# Patient Record
Sex: FEMALE | Race: WHITE | NOT HISPANIC OR LATINO | Employment: UNEMPLOYED | URBAN - METROPOLITAN AREA
[De-identification: names, ages, dates, MRNs, and addresses within clinical notes are randomized per-mention and may not be internally consistent; named-entity substitution may affect disease eponyms.]

---

## 2019-08-22 ENCOUNTER — CONVERSION ENCOUNTER (OUTPATIENT)
Dept: INTERNAL MEDICINE | Facility: CLINIC | Age: 59
End: 2019-08-22

## 2019-08-22 ENCOUNTER — OFFICE VISIT CONVERTED (OUTPATIENT)
Dept: INTERNAL MEDICINE | Facility: CLINIC | Age: 59
End: 2019-08-22
Attending: INTERNAL MEDICINE

## 2019-08-22 ENCOUNTER — HOSPITAL ENCOUNTER (OUTPATIENT)
Dept: OTHER | Facility: HOSPITAL | Age: 59
Discharge: HOME | End: 2019-08-22
Attending: INTERNAL MEDICINE

## 2019-08-22 LAB
ALBUMIN SERPL-MCNC: 4.1 G/DL (ref 3.5–5)
ALBUMIN/GLOB SERPL: 1.3 {RATIO} (ref 1.4–2.6)
ALP SERPL-CCNC: 117 U/L (ref 53–141)
ALT SERPL-CCNC: 12 U/L (ref 10–40)
ANION GAP SERPL CALC-SCNC: 19 MMOL/L (ref 8–19)
AST SERPL-CCNC: 20 U/L (ref 15–50)
BASOPHILS # BLD AUTO: 0.06 10*3/UL (ref 0–0.2)
BASOPHILS NFR BLD AUTO: 0.5 % (ref 0–3)
BILIRUB SERPL-MCNC: 0.28 MG/DL (ref 0.2–1.3)
BNP SERPL-MCNC: 634 PG/ML (ref 0–900)
BUN SERPL-MCNC: 13 MG/DL (ref 5–25)
BUN/CREAT SERPL: 15 {RATIO} (ref 6–20)
CALCIUM SERPL-MCNC: 9.2 MG/DL (ref 8.7–10.4)
CHLORIDE SERPL-SCNC: 100 MMOL/L (ref 99–111)
CHOLEST SERPL-MCNC: 244 MG/DL (ref 107–200)
CHOLEST/HDLC SERPL: 5.5 {RATIO} (ref 3–6)
CONV ABS IMM GRAN: 0.05 10*3/UL (ref 0–0.2)
CONV CO2: 26 MMOL/L (ref 22–32)
CONV IMMATURE GRAN: 0.4 % (ref 0–1.8)
CONV TOTAL PROTEIN: 7.2 G/DL (ref 6.3–8.2)
CREAT UR-MCNC: 0.85 MG/DL (ref 0.5–0.9)
DEPRECATED RDW RBC AUTO: 49.4 FL (ref 36.4–46.3)
EOSINOPHIL # BLD AUTO: 0.09 10*3/UL (ref 0–0.7)
EOSINOPHIL # BLD AUTO: 0.8 % (ref 0–7)
ERYTHROCYTE [DISTWIDTH] IN BLOOD BY AUTOMATED COUNT: 13.8 % (ref 11.7–14.4)
GFR SERPLBLD BASED ON 1.73 SQ M-ARVRAT: >60 ML/MIN/{1.73_M2}
GLOBULIN UR ELPH-MCNC: 3.1 G/DL (ref 2–3.5)
GLUCOSE SERPL-MCNC: 66 MG/DL (ref 65–99)
HCT VFR BLD AUTO: 44.1 % (ref 37–47)
HDLC SERPL-MCNC: 44 MG/DL (ref 40–60)
HGB BLD-MCNC: 13.4 G/DL (ref 12–16)
LDLC SERPL CALC-MCNC: 142 MG/DL (ref 70–100)
LYMPHOCYTES # BLD AUTO: 2.45 10*3/UL (ref 1–5)
LYMPHOCYTES NFR BLD AUTO: 20.7 % (ref 20–45)
MCH RBC QN AUTO: 29.6 PG (ref 27–31)
MCHC RBC AUTO-ENTMCNC: 30.4 G/DL (ref 33–37)
MCV RBC AUTO: 97.4 FL (ref 81–99)
MONOCYTES # BLD AUTO: 0.79 10*3/UL (ref 0.2–1.2)
MONOCYTES NFR BLD AUTO: 6.7 % (ref 3–10)
NEUTROPHILS # BLD AUTO: 8.37 10*3/UL (ref 2–8)
NEUTROPHILS NFR BLD AUTO: 70.9 % (ref 30–85)
NRBC CBCN: 0 % (ref 0–0.7)
OSMOLALITY SERPL CALC.SUM OF ELEC: 290 MOSM/KG (ref 273–304)
PLATELET # BLD AUTO: 318 10*3/UL (ref 130–400)
PMV BLD AUTO: 11.4 FL (ref 9.4–12.3)
POTASSIUM SERPL-SCNC: 4.3 MMOL/L (ref 3.5–5.3)
RBC # BLD AUTO: 4.53 10*6/UL (ref 4.2–5.4)
SODIUM SERPL-SCNC: 141 MMOL/L (ref 135–147)
T4 FREE SERPL-MCNC: 0.4 NG/DL (ref 0.9–1.8)
TRIGL SERPL-MCNC: 290 MG/DL (ref 40–150)
TSH SERPL-ACNC: 35.05 M[IU]/L (ref 0.27–4.2)
VLDLC SERPL-MCNC: 58 MG/DL (ref 5–37)
WBC # BLD AUTO: 11.81 10*3/UL (ref 4.8–10.8)

## 2019-08-27 LAB
A1AT SERPL-MCNC: 153 MG/DL (ref 90–200)
PHENOTYPE: NORMAL

## 2019-08-29 ENCOUNTER — HOSPITAL ENCOUNTER (OUTPATIENT)
Dept: OTHER | Facility: HOSPITAL | Age: 59
Discharge: HOME | End: 2019-08-29
Attending: INTERNAL MEDICINE

## 2019-08-29 ENCOUNTER — OFFICE VISIT CONVERTED (OUTPATIENT)
Dept: INTERNAL MEDICINE | Facility: CLINIC | Age: 59
End: 2019-08-29
Attending: INTERNAL MEDICINE

## 2019-08-29 ENCOUNTER — CONVERSION ENCOUNTER (OUTPATIENT)
Dept: OTHER | Facility: HOSPITAL | Age: 59
End: 2019-08-29

## 2019-08-29 LAB
T4 FREE SERPL-MCNC: 0.8 NG/DL (ref 0.9–1.8)
TSH SERPL-ACNC: 21.61 M[IU]/L (ref 0.27–4.2)

## 2019-09-03 ENCOUNTER — HOSPITAL ENCOUNTER (OUTPATIENT)
Dept: ULTRASOUND IMAGING | Facility: HOSPITAL | Age: 59
Discharge: HOME | End: 2019-09-03
Attending: INTERNAL MEDICINE

## 2019-09-09 ENCOUNTER — HOSPITAL ENCOUNTER (OUTPATIENT)
Dept: CARDIOLOGY | Facility: HOSPITAL | Age: 59
Discharge: HOME | End: 2019-09-09
Attending: INTERNAL MEDICINE

## 2019-09-12 ENCOUNTER — HOSPITAL ENCOUNTER (OUTPATIENT)
Dept: OTHER | Facility: HOSPITAL | Age: 59
Discharge: HOME | End: 2019-09-12
Attending: INTERNAL MEDICINE

## 2019-09-12 ENCOUNTER — OFFICE VISIT CONVERTED (OUTPATIENT)
Dept: INTERNAL MEDICINE | Facility: CLINIC | Age: 59
End: 2019-09-12
Attending: INTERNAL MEDICINE

## 2019-09-12 LAB
T4 FREE SERPL-MCNC: 1.1 NG/DL (ref 0.9–1.8)
TSH SERPL-ACNC: 7.93 M[IU]/L (ref 0.27–4.2)

## 2019-09-19 ENCOUNTER — HOSPITAL ENCOUNTER (OUTPATIENT)
Dept: GENERAL RADIOLOGY | Facility: HOSPITAL | Age: 59
Discharge: HOME | End: 2019-09-19
Attending: INTERNAL MEDICINE

## 2019-12-12 ENCOUNTER — HOSPITAL ENCOUNTER (OUTPATIENT)
Dept: OTHER | Facility: HOSPITAL | Age: 59
Discharge: HOME | End: 2019-12-12
Attending: INTERNAL MEDICINE

## 2019-12-12 ENCOUNTER — OFFICE VISIT CONVERTED (OUTPATIENT)
Dept: INTERNAL MEDICINE | Facility: CLINIC | Age: 59
End: 2019-12-12
Attending: INTERNAL MEDICINE

## 2019-12-12 LAB
ALBUMIN SERPL-MCNC: 3.7 G/DL (ref 3.5–5)
ALBUMIN/GLOB SERPL: 1.2 {RATIO} (ref 1.4–2.6)
ALP SERPL-CCNC: 101 U/L (ref 53–141)
ALT SERPL-CCNC: 7 U/L (ref 10–40)
ANION GAP SERPL CALC-SCNC: 17 MMOL/L (ref 8–19)
AST SERPL-CCNC: 12 U/L (ref 15–50)
BASOPHILS # BLD AUTO: 0.05 10*3/UL (ref 0–0.2)
BASOPHILS NFR BLD AUTO: 0.6 % (ref 0–3)
BILIRUB SERPL-MCNC: 0.21 MG/DL (ref 0.2–1.3)
BUN SERPL-MCNC: 10 MG/DL (ref 5–25)
BUN/CREAT SERPL: 11 {RATIO} (ref 6–20)
CALCIUM SERPL-MCNC: 9.5 MG/DL (ref 8.7–10.4)
CHLORIDE SERPL-SCNC: 102 MMOL/L (ref 99–111)
CHOLEST SERPL-MCNC: 213 MG/DL (ref 107–200)
CHOLEST/HDLC SERPL: 5.8 {RATIO} (ref 3–6)
CONV ABS IMM GRAN: 0.03 10*3/UL (ref 0–0.2)
CONV CO2: 26 MMOL/L (ref 22–32)
CONV IMMATURE GRAN: 0.4 % (ref 0–1.8)
CONV TOTAL PROTEIN: 6.7 G/DL (ref 6.3–8.2)
CREAT UR-MCNC: 0.89 MG/DL (ref 0.5–0.9)
DEPRECATED RDW RBC AUTO: 45.3 FL (ref 36.4–46.3)
EOSINOPHIL # BLD AUTO: 0.11 10*3/UL (ref 0–0.7)
EOSINOPHIL # BLD AUTO: 1.3 % (ref 0–7)
ERYTHROCYTE [DISTWIDTH] IN BLOOD BY AUTOMATED COUNT: 12.7 % (ref 11.7–14.4)
GFR SERPLBLD BASED ON 1.73 SQ M-ARVRAT: >60 ML/MIN/{1.73_M2}
GLOBULIN UR ELPH-MCNC: 3 G/DL (ref 2–3.5)
GLUCOSE SERPL-MCNC: 76 MG/DL (ref 65–99)
HCT VFR BLD AUTO: 42.5 % (ref 37–47)
HDLC SERPL-MCNC: 37 MG/DL (ref 40–60)
HGB BLD-MCNC: 13.5 G/DL (ref 12–16)
LDLC SERPL CALC-MCNC: 137 MG/DL (ref 70–100)
LYMPHOCYTES # BLD AUTO: 2.18 10*3/UL (ref 1–5)
LYMPHOCYTES NFR BLD AUTO: 25.6 % (ref 20–45)
MCH RBC QN AUTO: 30.5 PG (ref 27–31)
MCHC RBC AUTO-ENTMCNC: 31.8 G/DL (ref 33–37)
MCV RBC AUTO: 96.2 FL (ref 81–99)
MONOCYTES # BLD AUTO: 0.56 10*3/UL (ref 0.2–1.2)
MONOCYTES NFR BLD AUTO: 6.6 % (ref 3–10)
NEUTROPHILS # BLD AUTO: 5.57 10*3/UL (ref 2–8)
NEUTROPHILS NFR BLD AUTO: 65.5 % (ref 30–85)
NRBC CBCN: 0 % (ref 0–0.7)
OSMOLALITY SERPL CALC.SUM OF ELEC: 288 MOSM/KG (ref 273–304)
PLATELET # BLD AUTO: 318 10*3/UL (ref 130–400)
PMV BLD AUTO: 11.5 FL (ref 9.4–12.3)
POTASSIUM SERPL-SCNC: 4.8 MMOL/L (ref 3.5–5.3)
RBC # BLD AUTO: 4.42 10*6/UL (ref 4.2–5.4)
SODIUM SERPL-SCNC: 140 MMOL/L (ref 135–147)
T4 FREE SERPL-MCNC: 0.8 NG/DL (ref 0.9–1.8)
TRIGL SERPL-MCNC: 196 MG/DL (ref 40–150)
TSH SERPL-ACNC: 12.03 M[IU]/L (ref 0.27–4.2)
VLDLC SERPL-MCNC: 39 MG/DL (ref 5–37)
WBC # BLD AUTO: 8.5 10*3/UL (ref 4.8–10.8)

## 2020-02-10 ENCOUNTER — HOSPITAL ENCOUNTER (OUTPATIENT)
Dept: OTHER | Facility: HOSPITAL | Age: 60
Discharge: HOME | End: 2020-02-10
Attending: INTERNAL MEDICINE

## 2020-02-10 LAB
T4 FREE SERPL-MCNC: 0.9 NG/DL (ref 0.9–1.8)
TSH SERPL-ACNC: 10.7 M[IU]/L (ref 0.27–4.2)

## 2021-05-15 VITALS
WEIGHT: 212.5 LBS | HEIGHT: 55 IN | BODY MASS INDEX: 49.18 KG/M2 | HEART RATE: 69 BPM | TEMPERATURE: 97.2 F | SYSTOLIC BLOOD PRESSURE: 132 MMHG | OXYGEN SATURATION: 100 % | DIASTOLIC BLOOD PRESSURE: 74 MMHG

## 2021-05-15 VITALS
OXYGEN SATURATION: 97 % | TEMPERATURE: 97.3 F | DIASTOLIC BLOOD PRESSURE: 78 MMHG | HEART RATE: 77 BPM | BODY MASS INDEX: 38.56 KG/M2 | WEIGHT: 204.25 LBS | SYSTOLIC BLOOD PRESSURE: 142 MMHG | HEIGHT: 61 IN

## 2021-05-15 VITALS
DIASTOLIC BLOOD PRESSURE: 84 MMHG | OXYGEN SATURATION: 97 % | WEIGHT: 212.37 LBS | TEMPERATURE: 96.8 F | SYSTOLIC BLOOD PRESSURE: 130 MMHG | BODY MASS INDEX: 40.1 KG/M2 | HEIGHT: 61 IN | HEART RATE: 78 BPM

## 2021-05-15 VITALS
OXYGEN SATURATION: 98 % | WEIGHT: 212 LBS | HEART RATE: 71 BPM | DIASTOLIC BLOOD PRESSURE: 74 MMHG | BODY MASS INDEX: 40.02 KG/M2 | SYSTOLIC BLOOD PRESSURE: 122 MMHG | TEMPERATURE: 96.1 F | HEIGHT: 61 IN

## 2021-06-12 RX ORDER — TIOTROPIUM BROMIDE AND OLODATEROL 3.124; 2.736 UG/1; UG/1
SPRAY, METERED RESPIRATORY (INHALATION)
Qty: 4 G | Refills: 0 | OUTPATIENT
Start: 2021-06-12

## 2021-06-12 RX ORDER — IPRATROPIUM/ALBUTEROL SULFATE 20-100 MCG
MIST INHALER (GRAM) INHALATION
Qty: 4 G | Refills: 2 | OUTPATIENT
Start: 2021-06-12

## 2021-06-15 NOTE — TELEPHONE ENCOUNTER
Please call patient and schedule appointment. Once appointment is made, we can send in refill to Dr. Tristan.

## 2021-07-08 RX ORDER — MIRTAZAPINE 30 MG/1
TABLET, FILM COATED ORAL
COMMUNITY
End: 2022-09-30 | Stop reason: SDDI

## 2021-07-08 RX ORDER — HYDROXYZINE 50 MG/1
TABLET, FILM COATED ORAL
COMMUNITY
End: 2022-09-30 | Stop reason: SDDI

## 2021-07-08 RX ORDER — LEVOTHYROXINE SODIUM 88 UG/1
TABLET ORAL
COMMUNITY
End: 2022-09-30 | Stop reason: SDDI

## 2021-07-08 RX ORDER — GABAPENTIN 400 MG/1
CAPSULE ORAL
COMMUNITY
End: 2022-09-30 | Stop reason: SDDI

## 2021-07-08 NOTE — TELEPHONE ENCOUNTER
Caller: Denise Brito    Relationship: Self    Best call back number: [phone]     Medication needed:   Requested Prescriptions      No prescriptions requested or ordered in this encounter     Combivent Respimat  mcg/actuation inhalation mist  inhale 1 puff by inhalation route 4 times per day ; may take additional puffs as needed not to exceed 6 puffs in 24hrs      When do you need the refill by: ASAP    Does the patient have less than a 3 day supply:  [] Yes  [x] No    What is the patient's preferred pharmacy: [facility] - [address] [phone] [facility] [phone]

## 2022-05-11 NOTE — TELEPHONE ENCOUNTER
Caller: Denise Brito    Relationship: Self    Best call back number: [phone]    Requested Prescriptions:   Requested Prescriptions     Pending Prescriptions Disp Refills   • ipratropium-albuterol (COMBIVENT RESPIMAT)  MCG/ACT inhaler 3 each 0     Sig: Inhale 1 puff 4 (Four) Times a Day As Needed for Wheezing or Shortness of Air.        Pharmacy where request should be sent: [facility] [address] [phone] PH - [phone] FX       Does the patient have less than a 3 day supply:  [x] Yes  [] No    Justen ALBA Rep   05/11/22 16:18 EDT

## 2022-07-21 NOTE — TELEPHONE ENCOUNTER
Caller: Denise Brito    Relationship: Self    Best call back number: [phone]    Requested Prescriptions:   Requested Prescriptions     Pending Prescriptions Disp Refills   • ipratropium-albuterol (COMBIVENT RESPIMAT)  MCG/ACT inhaler 3 each 0     Sig: Inhale 1 puff 4 (Four) Times a Day As Needed for Wheezing or Shortness of Air.        Pharmacy where request should be sent: [facility] [address] [phone] PH - [phone]      Additional details provided by patient: PATIENT CALLED STATING SHE IS NEEDING THIS TO BE SENT TO THE PHARMACY DUE TO HER BEING COMPLETELY OUT      Does the patient have less than a 3 day supply:  [x] Yes  [] No    Justen Alvarez Rep   07/21/22 14:56 EDT

## 2022-09-14 ENCOUNTER — NURSE TRIAGE (OUTPATIENT)
Dept: CALL CENTER | Facility: HOSPITAL | Age: 62
End: 2022-09-14

## 2022-09-14 NOTE — TELEPHONE ENCOUNTER
"HUB call. Patient currently denies any respiratory distress or chest pain. Requesting f/u appt. For some increased SOA at night. Connected patient with nurse at Dr. Tristan's office thru [facility] [phone] for appointment.    Reason for Disposition  • Nursing judgment    Additional Information  • Negative: Nursing judgment  • Negative: Nursing judgment    Answer Assessment - Initial Assessment Questions  1. REASON FOR CALL or QUESTION: \"What is your reason for calling today?\" or \"How can I best help you?\" or \"What question do you have that I can help answer?\"      Caller requesting appointment for f/u. States having increased soa at night time. Currently denies any soa but has hx of CHF and COPD.    Protocols used: NO PROTOCOL AVAILABLE - INFORMATION ONLY-ADULT-[place]      "

## 2022-09-30 ENCOUNTER — OFFICE VISIT (OUTPATIENT)
Dept: INTERNAL MEDICINE | Facility: CLINIC | Age: 62
End: 2022-09-30

## 2022-09-30 VITALS
HEART RATE: 67 BPM | RESPIRATION RATE: 17 BRPM | OXYGEN SATURATION: 97 % | BODY MASS INDEX: 46.03 KG/M2 | SYSTOLIC BLOOD PRESSURE: 126 MMHG | WEIGHT: 243.6 LBS | TEMPERATURE: 98.1 F | DIASTOLIC BLOOD PRESSURE: 76 MMHG

## 2022-09-30 DIAGNOSIS — R06.09 DYSPNEA ON EXERTION: Primary | ICD-10-CM

## 2022-09-30 DIAGNOSIS — Z76.89 ENCOUNTER FOR SOCIAL WORK INTERVENTION: ICD-10-CM

## 2022-09-30 DIAGNOSIS — F41.9 ANXIETY: ICD-10-CM

## 2022-09-30 DIAGNOSIS — J41.8 MIXED SIMPLE AND MUCOPURULENT CHRONIC BRONCHITIS: ICD-10-CM

## 2022-09-30 PROBLEM — M19.90 ARTHRITIS: Status: ACTIVE | Noted: 2022-09-30

## 2022-09-30 PROBLEM — J44.9 COPD (CHRONIC OBSTRUCTIVE PULMONARY DISEASE) (HCC): Status: ACTIVE | Noted: 2022-09-30

## 2022-09-30 PROBLEM — R61 NIGHT SWEATS: Status: ACTIVE | Noted: 2022-09-30

## 2022-09-30 PROBLEM — J45.909 ASTHMA: Status: ACTIVE | Noted: 2022-09-30

## 2022-09-30 PROBLEM — I50.9 CHF (CONGESTIVE HEART FAILURE) (HCC): Status: ACTIVE | Noted: 2022-09-30

## 2022-09-30 LAB
ALBUMIN SERPL-MCNC: 4.1 G/DL (ref 3.5–5.2)
ALBUMIN/GLOB SERPL: 1.4 G/DL
ALP SERPL-CCNC: 106 U/L (ref 39–117)
ALT SERPL W P-5'-P-CCNC: 12 U/L (ref 1–33)
ANION GAP SERPL CALCULATED.3IONS-SCNC: 7.7 MMOL/L (ref 5–15)
AST SERPL-CCNC: 19 U/L (ref 1–32)
BASOPHILS # BLD AUTO: 0.06 10*3/MM3 (ref 0–0.2)
BASOPHILS NFR BLD AUTO: 0.6 % (ref 0–1.5)
BILIRUB SERPL-MCNC: 0.3 MG/DL (ref 0–1.2)
BUN SERPL-MCNC: 17 MG/DL (ref 8–23)
BUN/CREAT SERPL: 17.2 (ref 7–25)
CALCIUM SPEC-SCNC: 9 MG/DL (ref 8.6–10.5)
CHLORIDE SERPL-SCNC: 101 MMOL/L (ref 98–107)
CHOLEST SERPL-MCNC: 259 MG/DL (ref 0–200)
CO2 SERPL-SCNC: 27.3 MMOL/L (ref 22–29)
CREAT SERPL-MCNC: 0.99 MG/DL (ref 0.57–1)
DEPRECATED RDW RBC AUTO: 43.2 FL (ref 37–54)
EGFRCR SERPLBLD CKD-EPI 2021: 65.4 ML/MIN/1.73
EOSINOPHIL # BLD AUTO: 0.12 10*3/MM3 (ref 0–0.4)
EOSINOPHIL NFR BLD AUTO: 1.2 % (ref 0.3–6.2)
ERYTHROCYTE [DISTWIDTH] IN BLOOD BY AUTOMATED COUNT: 12.8 % (ref 12.3–15.4)
GLOBULIN UR ELPH-MCNC: 3 GM/DL
GLUCOSE SERPL-MCNC: 76 MG/DL (ref 65–99)
HCT VFR BLD AUTO: 41.5 % (ref 34–46.6)
HDLC SERPL-MCNC: 41 MG/DL (ref 40–60)
HGB BLD-MCNC: 14 G/DL (ref 12–15.9)
IMM GRANULOCYTES # BLD AUTO: 0.05 10*3/MM3 (ref 0–0.05)
IMM GRANULOCYTES NFR BLD AUTO: 0.5 % (ref 0–0.5)
LDLC SERPL CALC-MCNC: 134 MG/DL (ref 0–100)
LDLC/HDLC SERPL: 3.06 {RATIO}
LYMPHOCYTES # BLD AUTO: 1.84 10*3/MM3 (ref 0.7–3.1)
LYMPHOCYTES NFR BLD AUTO: 18.4 % (ref 19.6–45.3)
MCH RBC QN AUTO: 30.8 PG (ref 26.6–33)
MCHC RBC AUTO-ENTMCNC: 33.7 G/DL (ref 31.5–35.7)
MCV RBC AUTO: 91.4 FL (ref 79–97)
MONOCYTES # BLD AUTO: 0.7 10*3/MM3 (ref 0.1–0.9)
MONOCYTES NFR BLD AUTO: 7 % (ref 5–12)
NEUTROPHILS NFR BLD AUTO: 7.21 10*3/MM3 (ref 1.7–7)
NEUTROPHILS NFR BLD AUTO: 72.3 % (ref 42.7–76)
NRBC BLD AUTO-RTO: 0 /100 WBC (ref 0–0.2)
NT-PROBNP SERPL-MCNC: 213 PG/ML (ref 0–900)
PLATELET # BLD AUTO: 262 10*3/MM3 (ref 140–450)
PMV BLD AUTO: 11.6 FL (ref 6–12)
POTASSIUM SERPL-SCNC: 4.4 MMOL/L (ref 3.5–5.2)
PROT SERPL-MCNC: 7.1 G/DL (ref 6–8.5)
RBC # BLD AUTO: 4.54 10*6/MM3 (ref 3.77–5.28)
SODIUM SERPL-SCNC: 136 MMOL/L (ref 136–145)
TRIGL SERPL-MCNC: 463 MG/DL (ref 0–150)
TSH SERPL DL<=0.05 MIU/L-ACNC: 42.8 UIU/ML (ref 0.27–4.2)
VLDLC SERPL-MCNC: 84 MG/DL (ref 5–40)
WBC NRBC COR # BLD: 9.98 10*3/MM3 (ref 3.4–10.8)

## 2022-09-30 PROCEDURE — 84443 ASSAY THYROID STIM HORMONE: CPT | Performed by: INTERNAL MEDICINE

## 2022-09-30 PROCEDURE — 90471 IMMUNIZATION ADMIN: CPT | Performed by: INTERNAL MEDICINE

## 2022-09-30 PROCEDURE — 85025 COMPLETE CBC W/AUTO DIFF WBC: CPT | Performed by: INTERNAL MEDICINE

## 2022-09-30 PROCEDURE — 90677 PCV20 VACCINE IM: CPT | Performed by: INTERNAL MEDICINE

## 2022-09-30 PROCEDURE — 99214 OFFICE O/P EST MOD 30 MIN: CPT | Performed by: INTERNAL MEDICINE

## 2022-09-30 PROCEDURE — 83880 ASSAY OF NATRIURETIC PEPTIDE: CPT | Performed by: INTERNAL MEDICINE

## 2022-09-30 PROCEDURE — 80061 LIPID PANEL: CPT | Performed by: INTERNAL MEDICINE

## 2022-09-30 PROCEDURE — 80053 COMPREHEN METABOLIC PANEL: CPT | Performed by: INTERNAL MEDICINE

## 2022-09-30 RX ORDER — SERTRALINE HYDROCHLORIDE 25 MG/1
25 TABLET, FILM COATED ORAL DAILY
Qty: 90 TABLET | Refills: 1 | Status: SHIPPED | OUTPATIENT
Start: 2022-09-30 | End: 2023-01-03

## 2022-09-30 RX ORDER — GLYCOPYRROLATE AND FORMOTEROL FUMARATE 9; 4.8 UG/1; UG/1
2 AEROSOL, METERED RESPIRATORY (INHALATION) 2 TIMES DAILY
Qty: 10.7 G | Refills: 2 | Status: SHIPPED | OUTPATIENT
Start: 2022-09-30

## 2022-09-30 RX ORDER — ALBUTEROL SULFATE 90 UG/1
2 AEROSOL, METERED RESPIRATORY (INHALATION) EVERY 4 HOURS PRN
Qty: 18 G | Refills: 3 | Status: SHIPPED | OUTPATIENT
Start: 2022-09-30

## 2022-10-03 ENCOUNTER — REFERRAL TRIAGE (OUTPATIENT)
Dept: CASE MANAGEMENT | Facility: OTHER | Age: 62
End: 2022-10-03

## 2022-10-05 ENCOUNTER — PATIENT OUTREACH (OUTPATIENT)
Dept: CASE MANAGEMENT | Facility: OTHER | Age: 62
End: 2022-10-05

## 2022-10-05 NOTE — OUTREACH NOTE
Social Work Assessment  Questions/Answers    Flowsheet Row Most Recent Value   Referral Source physician   Reason for Consult community resources   Preferred Language English   People in Home friend(s)   Current Living Arrangements home   Primary Care Provided by self   Provides Primary Care For no one, unable/limited ability to care for self   Family Caregiver if Needed child(blanka), adult   Source of Income disability   Financial/Environmental Concerns unable to afford food, unable to afford rent/mortgage   Application for Public Assistance pending public assistance pending number        SDOH updated and reviewed with the patient during this program:  Financial Resource Strain: Medium Risk   • Difficulty of Paying Living Expenses: Somewhat hard      Food Insecurity: No Food Insecurity   • Worried About Running Out of Food in the Last Year: Never true   • Ran Out of Food in the Last Year: Never true      Transportation Needs: No Transportation Needs   • Lack of Transportation (Medical): No   • Lack of Transportation (Non-Medical): No      Housing Stability: High Risk   • Unable to Pay for Housing in the Last Year: No   • Number of Places Lived in the Last Year: 2   • Unstable Housing in the Last Year: Yes      Continuing Care   Community & DME   [facility] [place] [address] [address] [facility] [address]    Phone: [phone]    Resource for: Food Insecurity   [place] [employer]    [address] [place] [address] [address], [facility] [address]    Phone: [phone]    Resource for: Housing Stability   [facility]    [address] [facility] [place] [address]    Phone: [phone]    Resource for: Financial Resource Strain, Food Insecurity     Patient Outreach    MSW Spoke with patient to discuss resources needed. Patient spoke about unstable housing currently and risk for possibly being homeless in the future. MSW sent housing list of [place] low income  [facility] to Una in the office who will mail to patient. MSW also provided patient with transportation resources if needed and food resources including Meals on Wheels and local food guajardo. Patient has MSW contact information if needed in the future.    ROBERTO UMANZOR -   Ambulatory Case Management    10/5/2022, 14:37 EDT

## 2022-10-10 ENCOUNTER — PATIENT OUTREACH (OUTPATIENT)
Dept: CASE MANAGEMENT | Facility: OTHER | Age: 62
End: 2022-10-10

## 2022-10-10 NOTE — OUTREACH NOTE
Patient Outreach    MSW received call from patient that her roommate is moving in with her children and currently in rehab. Patient states that her roommate is being discharged from rehab and her children are coming to move her this weekend. Patient states that she is being told from her roommate that she has until 10/31 to move out. MSW advised that patient would need to receive an eviction letter from the [facility], but patient has paid rent for October. Patient states that she is worried about having to live in her car. Patient has been mailed a list of low income rental housing in the area and should have soon. MSW advised for patient to inform MSW if she doesn't receive this list. Patient expressed understanding. MSW also sent info for emergency housing through [employer] and [employer]. Patient states that her meals on wheels have started and she also has an appointment tomorrow with [facility] for in home supports. Patient very thankful for assistance. MSW to continue to f/u.    ROBERTO Wong   Ambulatory Case Management    10/10/2022, 13:44 EDT

## 2022-10-11 RX ORDER — AZITHROMYCIN 250 MG/1
TABLET, FILM COATED ORAL
Qty: 6 TABLET | Refills: 0 | Status: SHIPPED | OUTPATIENT
Start: 2022-10-11

## 2022-10-11 RX ORDER — LEVOTHYROXINE SODIUM 0.05 MG/1
50 TABLET ORAL DAILY
Qty: 90 TABLET | Refills: 1 | Status: SHIPPED | OUTPATIENT
Start: 2022-10-11

## 2022-10-11 NOTE — TELEPHONE ENCOUNTER
Pended Thyroid medication. [facility].

## 2022-10-11 NOTE — TELEPHONE ENCOUNTER
Please have her increase the zoloft to 50mg po daily.  We usually have bevespi samples can we put some aside for her?  We have have to work with Marisela to get anythign else covered unfortuantely inhaler wise.

## 2022-10-11 NOTE — TELEPHONE ENCOUNTER
----- Message from Laverne Tristan MD sent at 10/3/2022 12:53 PM EDT -----  Please call and let her know that no pneumonia was seen, but bronchitis was.  Does she do well with a zpak?  If so please call in for her to help with inflammation as we get this under control for her.

## 2022-10-11 NOTE — TELEPHONE ENCOUNTER
Spoke with patient who stated that she is okay with a Zpak. She said that she has been talking to Marisela Jacinto  due to she is going to be homeless soon. She said that she did get her Meals on Wheels but she needs more resources and assistance.       Patient also stated that her anxiety/ stress is high and is taking the Zoloft 25 MG but feels like it is not working the same.     Also patient stated that the Albuterol Inhaler was not covered by the insurance. She is using the Bevespi and Combivent inhaler and it is doing okay with it but she worried about not being on the Albuterol.

## 2022-10-11 NOTE — TELEPHONE ENCOUNTER
----- Message from Laverne Tristan MD sent at 10/3/2022 12:40 PM EDT -----  Thyroid is way off (too low) recommend starting synthroid 50mcg daily and recheck levels at visit in one month.  Please call in meds for her if she is ok with this.  Cholesterol slightly off but this can happen with thyroid issues so just watch for now.

## 2022-10-13 NOTE — TELEPHONE ENCOUNTER
Left message for patient to call back. No bevespi samples available at this time.  HUB PLEASE READ: Increase zoloft to 50mg daily. We will need to work with Marisela to get inhalers covered unfortunately.

## 2022-10-13 NOTE — TELEPHONE ENCOUNTER
HUB TO READ MESSAGE GIVEN TO PATIENT. PATIENT EXPRESSED UNDERSTAND. PATIENT STATES SHE WOULD LIKE FOR US TO REACH OUT TO INSURANCE TO GET HER THE INHALER. NO FOLLOW UP QUESTIONS AT THIS TIME.

## 2022-10-27 ENCOUNTER — PATIENT OUTREACH (OUTPATIENT)
Dept: CASE MANAGEMENT | Facility: OTHER | Age: 62
End: 2022-10-27

## 2022-10-27 NOTE — OUTREACH NOTE
Patient Outreach    MSW left VM with patient for an update and to check on patient since last phone call (living situation). MSW awaiting a call back.    ROBERTO UMANZOR -   Ambulatory Case Management    10/27/2022, 09:55 EDT

## 2022-10-28 ENCOUNTER — TELEPHONE (OUTPATIENT)
Dept: INTERNAL MEDICINE | Facility: CLINIC | Age: 62
End: 2022-10-28

## 2022-10-28 NOTE — TELEPHONE ENCOUNTER
Caller: Denise Brito    Relationship to patient: Self    Best call back number: [phone]    Chief complaint: ONE MONTH FOLLOW UP    Type of visit: OFFICE VISIT/FOLLOW UP    Requested date: BEFORE FIRST AVAILABLE ON 01.13.2022    If rescheduling, when is the original appointment: 11.01.2022

## 2022-11-01 ENCOUNTER — PATIENT OUTREACH (OUTPATIENT)
Dept: CASE MANAGEMENT | Facility: OTHER | Age: 62
End: 2022-11-01

## 2022-11-01 NOTE — OUTREACH NOTE
Patient Outreach    MSW received call from patient that she is moving into new place tomorrow. Patient needing assistance with deposits for the new move. MSW provided resources through [employer], and [employer]. Patient states that she is looking forward to being in her own place. MSW also provided patient with [facility] information to change her address and start services with them once she moves.    ROBERTO UMANZOR -   Ambulatory Case Management    11/1/2022, 13:47 EDT

## 2022-12-23 ENCOUNTER — APPOINTMENT (OUTPATIENT)
Dept: RESPIRATORY THERAPY | Facility: HOSPITAL | Age: 62
End: 2022-12-23

## 2023-01-03 RX ORDER — SERTRALINE HYDROCHLORIDE 25 MG/1
TABLET, FILM COATED ORAL
Qty: 90 TABLET | Refills: 1 | Status: SHIPPED | OUTPATIENT
Start: 2023-01-03

## 2023-08-24 RX ORDER — ALBUTEROL SULFATE 90 UG/1
AEROSOL, METERED RESPIRATORY (INHALATION)
Qty: 18 G | Refills: 3 | Status: SHIPPED | OUTPATIENT
Start: 2023-08-24

## 2023-10-19 RX ORDER — IPRATROPIUM BROMIDE AND ALBUTEROL 20; 100 UG/1; UG/1
SPRAY, METERED RESPIRATORY (INHALATION)
Qty: 12 G | Refills: 0 | Status: SHIPPED | OUTPATIENT
Start: 2023-10-19

## 2023-10-19 RX ORDER — SERTRALINE HYDROCHLORIDE 25 MG/1
TABLET, FILM COATED ORAL
Qty: 90 TABLET | Refills: 1 | Status: SHIPPED | OUTPATIENT
Start: 2023-10-19

## 2023-12-12 ENCOUNTER — NURSE TRIAGE (OUTPATIENT)
Dept: CALL CENTER | Facility: HOSPITAL | Age: 63
End: 2023-12-12
Payer: COMMERCIAL

## 2023-12-12 NOTE — TELEPHONE ENCOUNTER
"Call transferred from HUB. Patient wanting to make appointment with PCP to get oxygen. States she was supposed to be out on oxygen last year, however due to circumstances at that time unable to due so.has not seen PCP in a year. Reports SOB at rest and on exertion, unable to sleep lying flat, thinks she is passing out \"not sleeping\" due to low oxygen. States 02 SATs \"can't get above 95% even on a friend's oxygen\".  Unable to assess current 02 SATs as does not have pulse oximeter present. History of COPD, CHF. Advised patient she needs to be see in ER or at least [facility]. Initially Patient states she has too much going on to wait around for an ER doctor to see her and then tell her to go see her PCP. Advised patient per HUB her PCP did not have any appointments until February or March. Patients states it has been going this long I can wait, if I die, I die. Instructed patient that per protocol NCC recommends going to to ER to be seen, however will send message to PCP office to call her. Advised patient if SOB gets worse, she will need to call 911. Patient Verbalizes understanding.           Reason for Disposition   [1] Longstanding difficulty breathing AND [2] not responding to usual therapy    Additional Information   Negative: SEVERE difficulty breathing (e.g., struggling for each breath, speaks in single words)   Negative: [1] Breathing stopped AND [2] hasn't returned   Negative: Choking on something   Negative: Bluish (or gray) lips or face now   Negative: Difficult to awaken or acting confused (e.g., disoriented, slurred speech)   Negative: Passed out (i.e., lost consciousness, collapsed and was not responding)   Negative: Wheezing started suddenly after medicine, an allergic food or bee sting   Negative: Stridor (harsh sound while breathing in)   Negative: Slow, shallow and weak breathing   Negative: Sounds like a life-threatening emergency to the triager   Negative: Chest pain   Negative: [1] Wheezing (high " "pitched whistling sound) AND [2] previous asthma attacks or use of asthma medicines   Negative: [1] Difficulty breathing AND [2] only present when coughing   Negative: [1] Difficulty breathing AND [2] only from stuffy or runny nose   Negative: [1] Difficulty breathing AND [2] within 14 days of COVID-19 Exposure   Negative: [1] MODERATE difficulty breathing (e.g., speaks in phrases, SOB even at rest, pulse 100-120) AND [2] NEW-onset or WORSE than normal   Negative: Oxygen level (e.g., pulse oximetry) 90 percent or lower   Negative: Wheezing can be heard across the room   Negative: Drooling or spitting out saliva (because can't swallow)   Negative: History of prior \"blood clot\" in leg or lungs (i.e., deep vein thrombosis, pulmonary embolism)   Negative: History of inherited increased risk of blood clots (e.g., Factor 5 Leiden, Anti-thrombin 3, Protein C or Protein S deficiency, Prothrombin mutation)   Negative: Major surgery in the past month   Negative: Hip or leg fracture (broken bone) in past month (or had cast on leg or ankle in past month)   Negative: Illness requiring prolonged bedrest in past month (e.g., immobilization, long hospital stay)   Negative: Long-distance travel in past month (e.g., car, bus, train, plane; with trip lasting 6 or more hours)   Negative: Cancer treatment in past six months (or has cancer now)   Negative: Extra heartbeats, irregular heart beating, or heart is beating very fast  (i.e., \"palpitations\")   Negative: Fever > 103 F (39.4 C)   Negative: [1] Fever > 101 F (38.3 C) AND [2] age > 60 years   Negative: [1] Fever > 100.0 F (37.8 C) AND [2] bedridden (e.g., CVA, chronic illness, recovering from surgery)   Negative: [1] Fever > 100.0 F (37.8 C) AND [2] diabetes mellitus or weak immune system (e.g., HIV positive, cancer chemo, splenectomy, organ transplant, chronic steroids)   Negative: [1] Periods where breathing stops and then resumes normally AND [2] bedridden (e.g., CVA)   " "Negative: Pregnant or postpartum (from 0 to 6 weeks after delivery)   Negative: Patient sounds very sick or weak to the triager   Negative: [1] MILD difficulty breathing (e.g., minimal/no SOB at rest, SOB with walking, pulse <100) AND [2] NEW-onset or WORSE than normal   Negative: [1] Longstanding difficulty breathing (e.g., CHF, COPD, emphysema) AND [2] WORSE than normal    Answer Assessment - Initial Assessment Questions  1. RESPIRATORY STATUS: \"Describe your breathing?\" (e.g., wheezing, shortness of breath, unable to speak, severe coughing)       SOB  2. ONSET: \"When did this breathing problem begin?\"      Over a year   3. PATTERN \"Does the difficult breathing come and go, or has it been constant since it started?\"       Constant  4. SEVERITY: \"How bad is your breathing?\" (e.g., mild, moderate, severe)     - MILD: No SOB at rest, mild SOB with walking, speaks normally in sentences, can lie down, no retractions, pulse < 100.     - MODERATE: SOB at rest, SOB with minimal exertion and prefers to sit, cannot lie down flat, speaks in phrases, mild retractions, audible wheezing, pulse 100-120.     - SEVERE: Very SOB at rest, speaks in single words, struggling to breathe, sitting hunched forward, retractions, pulse > 120       Moderate  5. RECURRENT SYMPTOM: \"Have you had difficulty breathing before?\" If Yes, ask: \"When was the last time?\" and \"What happened that time?\"       Over a year ago, was supposed to be prescribed oxygen   6. CARDIAC HISTORY: \"Do you have any history of heart disease?\" (e.g., heart attack, angina, bypass surgery, angioplasty)       CHF  7. LUNG HISTORY: \"Do you have any history of lung disease?\"  (e.g., pulmonary embolus, asthma, emphysema)     COPD  8. CAUSE: \"What do you think is causing the breathing problem?\"       Yes  9. OTHER SYMPTOMS: \"Do you have any other symptoms? (e.g., dizziness, runny nose, cough, chest pain, fever)      Chronic cough - no accute illness  10. O2 SATURATION MONITOR: " " \"Do you use an oxygen saturation monitor (pulse oximeter) at home?\" If Yes, ask: \"What is your reading (oxygen level) today?\" \"What is your usual oxygen saturation reading?\" (e.g., 95%)        FOUZIA - Pt does not have pulse ox with her.  11. PREGNANCY: \"Is there any chance you are pregnant?\" \"When was your last menstrual period?\"        N/a  12. TRAVEL: \"Have you traveled out of the country in the last month?\" (e.g., travel history, exposures)        N/a    Protocols used: Breathing Difficulty-ADULT-[place]    "

## 2023-12-13 ENCOUNTER — OFFICE VISIT (OUTPATIENT)
Dept: INTERNAL MEDICINE | Facility: CLINIC | Age: 63
End: 2023-12-13
Payer: COMMERCIAL

## 2023-12-13 VITALS
SYSTOLIC BLOOD PRESSURE: 126 MMHG | WEIGHT: 249.8 LBS | OXYGEN SATURATION: 94 % | HEART RATE: 70 BPM | DIASTOLIC BLOOD PRESSURE: 90 MMHG | BODY MASS INDEX: 47.16 KG/M2 | TEMPERATURE: 97.4 F | HEIGHT: 61 IN

## 2023-12-13 DIAGNOSIS — Z12.2 SCREENING FOR LUNG CANCER: ICD-10-CM

## 2023-12-13 DIAGNOSIS — E55.9 VITAMIN D DEFICIENCY: ICD-10-CM

## 2023-12-13 DIAGNOSIS — J42 CHRONIC BRONCHITIS, UNSPECIFIED CHRONIC BRONCHITIS TYPE: Primary | ICD-10-CM

## 2023-12-13 DIAGNOSIS — R26.89 BALANCE DISORDER: ICD-10-CM

## 2023-12-13 DIAGNOSIS — R06.00 DYSPNEA, UNSPECIFIED TYPE: ICD-10-CM

## 2023-12-13 DIAGNOSIS — I50.9 CONGESTIVE HEART FAILURE, UNSPECIFIED HF CHRONICITY, UNSPECIFIED HEART FAILURE TYPE: ICD-10-CM

## 2023-12-13 DIAGNOSIS — F41.9 ANXIETY: ICD-10-CM

## 2023-12-13 LAB
25(OH)D3 SERPL-MCNC: 9.5 NG/ML (ref 30–100)
ALBUMIN SERPL-MCNC: 4.4 G/DL (ref 3.5–5.2)
ALBUMIN/GLOB SERPL: 1.5 G/DL
ALP SERPL-CCNC: 112 U/L (ref 39–117)
ALT SERPL W P-5'-P-CCNC: 19 U/L (ref 1–33)
ANION GAP SERPL CALCULATED.3IONS-SCNC: 9 MMOL/L (ref 5–15)
AST SERPL-CCNC: 24 U/L (ref 1–32)
BASOPHILS # BLD AUTO: 0.07 10*3/MM3 (ref 0–0.2)
BASOPHILS NFR BLD AUTO: 0.7 % (ref 0–1.5)
BILIRUB SERPL-MCNC: 0.3 MG/DL (ref 0–1.2)
BUN SERPL-MCNC: 15 MG/DL (ref 8–23)
BUN/CREAT SERPL: 15.5 (ref 7–25)
CALCIUM SPEC-SCNC: 9.4 MG/DL (ref 8.6–10.5)
CHLORIDE SERPL-SCNC: 102 MMOL/L (ref 98–107)
CHOLEST SERPL-MCNC: 272 MG/DL (ref 0–200)
CO2 SERPL-SCNC: 29 MMOL/L (ref 22–29)
CREAT SERPL-MCNC: 0.97 MG/DL (ref 0.57–1)
DEPRECATED RDW RBC AUTO: 45 FL (ref 37–54)
EGFRCR SERPLBLD CKD-EPI 2021: 66.6 ML/MIN/1.73
EOSINOPHIL # BLD AUTO: 0.15 10*3/MM3 (ref 0–0.4)
EOSINOPHIL NFR BLD AUTO: 1.4 % (ref 0.3–6.2)
ERYTHROCYTE [DISTWIDTH] IN BLOOD BY AUTOMATED COUNT: 13.1 % (ref 12.3–15.4)
FOLATE SERPL-MCNC: 6.22 NG/ML (ref 4.78–24.2)
GLOBULIN UR ELPH-MCNC: 2.9 GM/DL
GLUCOSE SERPL-MCNC: 112 MG/DL (ref 65–99)
HCT VFR BLD AUTO: 36.4 % (ref 34–46.6)
HDLC SERPL-MCNC: 55 MG/DL (ref 40–60)
HGB BLD-MCNC: 12.1 G/DL (ref 12–15.9)
IMM GRANULOCYTES # BLD AUTO: 0.05 10*3/MM3 (ref 0–0.05)
IMM GRANULOCYTES NFR BLD AUTO: 0.5 % (ref 0–0.5)
IRON 24H UR-MRATE: 45 MCG/DL (ref 37–145)
IRON SATN MFR SERPL: 11 % (ref 20–50)
LDLC SERPL CALC-MCNC: 183 MG/DL (ref 0–100)
LDLC/HDLC SERPL: 3.28 {RATIO}
LYMPHOCYTES # BLD AUTO: 1.85 10*3/MM3 (ref 0.7–3.1)
LYMPHOCYTES NFR BLD AUTO: 17.3 % (ref 19.6–45.3)
MAGNESIUM SERPL-MCNC: 2.3 MG/DL (ref 1.6–2.4)
MCH RBC QN AUTO: 31.4 PG (ref 26.6–33)
MCHC RBC AUTO-ENTMCNC: 33.2 G/DL (ref 31.5–35.7)
MCV RBC AUTO: 94.5 FL (ref 79–97)
MONOCYTES # BLD AUTO: 0.7 10*3/MM3 (ref 0.1–0.9)
MONOCYTES NFR BLD AUTO: 6.6 % (ref 5–12)
NEUTROPHILS NFR BLD AUTO: 7.85 10*3/MM3 (ref 1.7–7)
NEUTROPHILS NFR BLD AUTO: 73.5 % (ref 42.7–76)
NRBC BLD AUTO-RTO: 0 /100 WBC (ref 0–0.2)
NT-PROBNP SERPL-MCNC: 798 PG/ML (ref 0–900)
PLATELET # BLD AUTO: 264 10*3/MM3 (ref 140–450)
PMV BLD AUTO: 12.1 FL (ref 6–12)
POTASSIUM SERPL-SCNC: 4.3 MMOL/L (ref 3.5–5.2)
PROT SERPL-MCNC: 7.3 G/DL (ref 6–8.5)
RBC # BLD AUTO: 3.85 10*6/MM3 (ref 3.77–5.28)
SODIUM SERPL-SCNC: 140 MMOL/L (ref 136–145)
T4 FREE SERPL-MCNC: 0.16 NG/DL (ref 0.93–1.7)
TIBC SERPL-MCNC: 392 MCG/DL (ref 298–536)
TRANSFERRIN SERPL-MCNC: 263 MG/DL (ref 200–360)
TRIGL SERPL-MCNC: 183 MG/DL (ref 0–150)
TSH SERPL DL<=0.05 MIU/L-ACNC: 44.6 UIU/ML (ref 0.27–4.2)
VIT B12 BLD-MCNC: 368 PG/ML (ref 211–946)
VLDLC SERPL-MCNC: 34 MG/DL (ref 5–40)
WBC NRBC COR # BLD AUTO: 10.67 10*3/MM3 (ref 3.4–10.8)

## 2023-12-13 PROCEDURE — 83880 ASSAY OF NATRIURETIC PEPTIDE: CPT | Performed by: INTERNAL MEDICINE

## 2023-12-13 PROCEDURE — 84439 ASSAY OF FREE THYROXINE: CPT | Performed by: INTERNAL MEDICINE

## 2023-12-13 PROCEDURE — 82746 ASSAY OF FOLIC ACID SERUM: CPT | Performed by: INTERNAL MEDICINE

## 2023-12-13 PROCEDURE — 83735 ASSAY OF MAGNESIUM: CPT | Performed by: INTERNAL MEDICINE

## 2023-12-13 PROCEDURE — 84443 ASSAY THYROID STIM HORMONE: CPT | Performed by: INTERNAL MEDICINE

## 2023-12-13 PROCEDURE — 82607 VITAMIN B-12: CPT | Performed by: INTERNAL MEDICINE

## 2023-12-13 PROCEDURE — 83540 ASSAY OF IRON: CPT | Performed by: INTERNAL MEDICINE

## 2023-12-13 PROCEDURE — 84466 ASSAY OF TRANSFERRIN: CPT | Performed by: INTERNAL MEDICINE

## 2023-12-13 PROCEDURE — 82306 VITAMIN D 25 HYDROXY: CPT | Performed by: INTERNAL MEDICINE

## 2023-12-13 PROCEDURE — 80053 COMPREHEN METABOLIC PANEL: CPT | Performed by: INTERNAL MEDICINE

## 2023-12-13 PROCEDURE — 80061 LIPID PANEL: CPT | Performed by: INTERNAL MEDICINE

## 2023-12-13 PROCEDURE — 85025 COMPLETE CBC W/AUTO DIFF WBC: CPT | Performed by: INTERNAL MEDICINE

## 2023-12-13 RX ORDER — BUPROPION HYDROCHLORIDE 150 MG/1
150 TABLET ORAL DAILY
Qty: 90 TABLET | Refills: 1 | Status: SHIPPED | OUTPATIENT
Start: 2023-12-13

## 2023-12-13 RX ORDER — LEVOTHYROXINE SODIUM 0.05 MG/1
50 TABLET ORAL DAILY
Qty: 90 TABLET | Refills: 1 | Status: SHIPPED | OUTPATIENT
Start: 2023-12-13

## 2023-12-13 NOTE — PROGRESS NOTES
"Chief Complaint  dyspnea on exertion, Shortness of Breath (Getting worse been going on for a couple months ), and Wheezing (Can hear herself and been passing out)    Subjective      Denise Brito is a 63 y.o. female who presents to [facility] with a past medical history of  Past Medical History:   Diagnosis Date    Alcoholism     Anxiety 2012    Arthritis 1995    Asthma     Bipolar disorder 2012    CHF (congestive heart failure)     COPD (chronic obstructive pulmonary disease)     Depression 2012    Emphysema lung     Migraine headache     Night sweats 2011    Sexually transmitted disease 1988    Sinusitis 01/26/2016    SOB (shortness of breath) 2005    Squamous cell carcinoma in situ     Thyroid disorder     problems       States that she is having trouble with falling asleep  A lot of trouble with catching her breath  She is getting teeth fixed  States that she had a home pulse ox that is usually 93-94    Still having some social troubles  She is working with a  currently  Doesn't have a car right now  Has a good place  Was able to get Moms meals and liking that        Objective   Vital Signs:   Vitals:    12/13/23 1043   BP: 126/90   BP Location: Right arm   Patient Position: Sitting   Cuff Size: Pediatric   Pulse: 70   Temp: 97.4 °F (36.3 °C)   TempSrc: Temporal   SpO2: 94%   Weight: 113 kg (249 lb 12.8 oz)   Height: 154.9 cm (61\")     Body mass index is 47.2 kg/m².    Wt Readings from Last 3 Encounters:   12/13/23 113 kg (249 lb 12.8 oz)   09/30/22 110 kg (243 lb 9.6 oz)   12/12/19 92.6 kg (204 lb 4 oz)     BP Readings from Last 3 Encounters:   12/13/23 126/90   09/30/22 126/76   12/12/19 142/78       Health Maintenance   Topic Date Due    COLORECTAL CANCER SCREENING  Never done    COVID-19 Vaccine (1) Never done    TDAP/TD VACCINES (1 - Tdap) Never done    MAMMOGRAM  09/21/2017    HEPATITIS C SCREENING  Never done    ANNUAL PHYSICAL  01/30/2024 " (Originally 6/11/2021)    INFLUENZA VACCINE  03/31/2024 (Originally 8/1/2023)    ZOSTER VACCINE (1 of 2) 12/13/2024 (Originally 7/29/2012)    PAP SMEAR  02/04/2025 (Originally 6/11/2021)    BMI FOLLOWUP  12/13/2024    Pneumococcal Vaccine 0-64  Completed       Physical Exam  Vitals reviewed.   Constitutional:       Appearance: Normal appearance. She is well-developed. She is obese.   HENT:      Head: Normocephalic and atraumatic.      Right Ear: External ear normal.      Left Ear: External ear normal.      Mouth/Throat:      Comments: Hoarse voice  Eyes:      Conjunctiva/sclera: Conjunctivae normal.      Pupils: Pupils are equal, round, and reactive to light.   Cardiovascular:      Rate and Rhythm: Normal rate and regular rhythm.      Heart sounds: No murmur heard.     No friction rub. No gallop.   Pulmonary:      Effort: Pulmonary effort is normal.      Breath sounds: Normal breath sounds. No wheezing or rhonchi.   Skin:     General: Skin is warm and dry.   Neurological:      Mental Status: She is alert and oriented to person, place, and time.   Psychiatric:         Mood and Affect: Affect normal.         Behavior: Behavior normal.         Thought Content: Thought content normal.      Comments: Quite tearful on exam            Result Review :  The following data was reviewed by: Laverne Tristan MD on 12/13/2023:      Procedures        Assessment and Plan   Diagnoses and all orders for this visit:    1. Chronic bronchitis, unspecified chronic bronchitis type (Primary)  -     Ambulatory Referral to Sleep Medicine  -     Miscellaneous DME  -     Miscellaneous DME  -     Comprehensive Metabolic Panel  -     CBC & Differential  -     TSH  -     Lipid Panel  -     Vitamin D,25-Hydroxy  -     Vitamin B12 & Folate  -     Iron Profile  -     Magnesium  -     6 Minute Walk Test; Future  -     proBNP; Future  -     T4, Free  -     proBNP    2. Balance disorder  -     Ambulatory Referral to Sleep Medicine  -      Miscellaneous DME  -     Miscellaneous DME  -     Comprehensive Metabolic Panel  -     CBC & Differential  -     TSH  -     Lipid Panel  -     Vitamin D,25-Hydroxy  -     Vitamin B12 & Folate  -     Iron Profile  -     Magnesium  -     6 Minute Walk Test; Future  -     proBNP; Future  -     T4, Free  -     proBNP    3. Congestive heart failure, unspecified HF chronicity, unspecified heart failure type  -     Ambulatory Referral to Sleep Medicine  -     Miscellaneous DME  -     Miscellaneous DME  -     Comprehensive Metabolic Panel  -     CBC & Differential  -     TSH  -     Lipid Panel  -     Vitamin D,25-Hydroxy  -     Vitamin B12 & Folate  -     Iron Profile  -     Magnesium  -     6 Minute Walk Test; Future  -     proBNP; Future  -     T4, Free  -     proBNP    4. Anxiety  -     Ambulatory Referral to Sleep Medicine  -     Miscellaneous DME  -     Miscellaneous DME  -     Comprehensive Metabolic Panel  -     CBC & Differential  -     TSH  -     Lipid Panel  -     Vitamin D,25-Hydroxy  -     Vitamin B12 & Folate  -     Iron Profile  -     Magnesium  -     6 Minute Walk Test; Future  -     proBNP; Future  -     T4, Free  -     proBNP    5. Vitamin D deficiency  -     Vitamin D,25-Hydroxy    6. Screening for lung cancer  -     CT Chest Without Contrast; Future    7. Dyspnea, unspecified type  -     Miscellaneous DME  -     XR Chest PA & Lateral; Future    Other orders  -     levothyroxine (Synthroid) 50 MCG tablet; Take 1 tablet by mouth Daily.  Dispense: 90 tablet; Refill: 1  -     buPROPion XL (Wellbutrin XL) 150 MG 24 hr tablet; Take 1 tablet by mouth Daily.  Dispense: 90 tablet; Refill: 1  -     ipratropium-albuterol (Combivent Respimat)  MCG/ACT inhaler; Inhale 2 puffs 4 (Four) Times a Day.  Dispense: 12 g; Refill: 0    Gave DME orders for which she is requesting to help with mobility  Will start a low-dose of Synthroid as I am sure her thyroid is going to be off  Will call in Combivent to help with  breathing  Will try Wellbutrin to help with stress    Class 3 Severe Obesity (BMI >=40). Obesity-related health conditions include the following:  COPD . Obesity is worsening. BMI is is above average; BMI management plan is completed. We discussed portion control and increasing exercise.         FOLLOW UP  Return in about 1 month (around 1/13/2024).  Patient was given instructions and counseling regarding her condition or for health maintenance advice. Please see specific information pulled into the AVS if appropriate.       Laverne Tristan MD  12/21/23  15:06 EST    CURRENT & DISCONTINUED MEDICATIONS  Current Outpatient Medications   Medication Instructions    albuterol sulfate  (90 Base) MCG/ACT inhaler 2 puffs, Inhalation, Every 4 Hours PRN, for wheezing    Bevespi Aerosphere 9-4.8 MCG/ACT aerosol INHALE 2 SPRAYS TWICE DAILY    buPROPion XL (WELLBUTRIN XL) 150 mg, Oral, Daily    ipratropium-albuterol (Combivent Respimat)  MCG/ACT inhaler 2 puffs, Inhalation, 4 Times Daily - RT    levothyroxine (SYNTHROID) 50 mcg, Oral, Daily    vitamin D (ERGOCALCIFEROL) 50,000 Units, Oral, Weekly       Medications Discontinued During This Encounter   Medication Reason    azithromycin (Zithromax Z-Brett) 250 MG tablet     levothyroxine (Synthroid) 50 MCG tablet     sertraline (ZOLOFT) 25 MG tablet     Combivent Respimat  MCG/ACT inhaler Reorder

## 2023-12-18 RX ORDER — ERGOCALCIFEROL 1.25 MG/1
50000 CAPSULE ORAL WEEKLY
Qty: 12 CAPSULE | Refills: 0 | Status: SHIPPED | OUTPATIENT
Start: 2023-12-18

## 2023-12-21 ENCOUNTER — TELEPHONE (OUTPATIENT)
Dept: INTERNAL MEDICINE | Facility: CLINIC | Age: 63
End: 2023-12-21
Payer: COMMERCIAL

## 2023-12-21 RX ORDER — IPRATROPIUM BROMIDE AND ALBUTEROL 20; 100 UG/1; UG/1
2 SPRAY, METERED RESPIRATORY (INHALATION)
Qty: 12 G | Refills: 0 | Status: SHIPPED | OUTPATIENT
Start: 2023-12-21

## 2023-12-21 RX ORDER — ALBUTEROL SULFATE 90 UG/1
2 AEROSOL, METERED RESPIRATORY (INHALATION) EVERY 4 HOURS PRN
Qty: 18 G | Refills: 3 | Status: SHIPPED | OUTPATIENT
Start: 2023-12-21

## 2023-12-21 NOTE — TELEPHONE ENCOUNTER
Caller: Denise Brito    Relationship: Self    Best call back number: [phone]    Requested Prescriptions:   Requested Prescriptions     Pending Prescriptions Disp Refills    albuterol sulfate  (90 Base) MCG/ACT inhaler 18 g 3     Si puffs Every 4 (Four) Hours As Needed. for wheezing        Pharmacy where request should be sent: [address] [phone] [facility] [phone]      Last office visit with prescribing clinician: 2023   Last telemedicine visit with prescribing clinician: Visit date not found   Next office visit with prescribing clinician: 2024       Does the patient have less than a 3 day supply:  [x] Yes  [] No    Justen Araujo Rep   23 11:21 EST

## 2024-01-04 RX ORDER — IPRATROPIUM BROMIDE AND ALBUTEROL 20; 100 UG/1; UG/1
2 SPRAY, METERED RESPIRATORY (INHALATION)
Qty: 12 G | Refills: 0 | Status: SHIPPED | OUTPATIENT
Start: 2024-01-04

## 2024-01-05 ENCOUNTER — HOSPITAL ENCOUNTER (OUTPATIENT)
Dept: CT IMAGING | Facility: HOSPITAL | Age: 64
Discharge: HOME | End: 2024-01-05
Payer: COMMERCIAL

## 2024-01-05 ENCOUNTER — HOSPITAL ENCOUNTER (OUTPATIENT)
Dept: RESPIRATORY THERAPY | Facility: HOSPITAL | Age: 64
Discharge: HOME | End: 2024-01-05
Payer: COMMERCIAL

## 2024-01-05 DIAGNOSIS — Z12.2 SCREENING FOR LUNG CANCER: ICD-10-CM

## 2024-01-05 DIAGNOSIS — F41.9 ANXIETY: ICD-10-CM

## 2024-01-05 DIAGNOSIS — I50.9 CONGESTIVE HEART FAILURE, UNSPECIFIED HF CHRONICITY, UNSPECIFIED HEART FAILURE TYPE: ICD-10-CM

## 2024-01-05 DIAGNOSIS — R26.89 BALANCE DISORDER: ICD-10-CM

## 2024-01-05 DIAGNOSIS — J42 CHRONIC BRONCHITIS, UNSPECIFIED CHRONIC BRONCHITIS TYPE: ICD-10-CM

## 2024-01-05 PROCEDURE — 71271 CT THORAX LUNG CANCER SCR C-: CPT

## 2024-01-05 PROCEDURE — 94618 PULMONARY STRESS TESTING: CPT

## 2024-01-05 NOTE — PROGRESS NOTES
Exercise Oximetry    Patient Name:Denise Brito   MRN: [id number]   Date: 01/05/24             ROOM AIR BASELINE   SpO2% 96   Heart Rate 79   Blood Pressure NA     EXERCISE ON ROOM AIR SpO2% EXERCISE ON O2 @ LPM SpO2%   1 MINUTE 94 1 MINUTE    2 MINUTES 93 2 MINUTES    3 MINUTES 94 3 MINUTES    4 MINUTES 92 4 MINUTES    5 MINUTES 95 5 MINUTES    6 MINUTES 94 6 MINUTES               Distance Walked  600' Distance Walked   Dyspnea (Yazmin Scale)  Pre-8 Post-8 Dyspnea (Yazmin Scale)   Fatigue (Yazmin Scale)  Pre-6 Post-9 Fatigue (Yazmin Scale)   SpO2% Post Exercise  96 SpO2% Post Exercise   HR Post Exercise  78 HR Post Exercise   Time to Recovery  4 minutes Time to Recovery     Comments: patient able to maintain O2 sat >=92% during walk, patient stated increased shortness of breath during walk, patient stated hip pain during walk

## 2024-01-24 ENCOUNTER — TELEPHONE (OUTPATIENT)
Dept: INTERNAL MEDICINE | Facility: CLINIC | Age: 64
End: 2024-01-24
Payer: COMMERCIAL

## 2024-02-05 ENCOUNTER — OFFICE VISIT (OUTPATIENT)
Dept: INTERNAL MEDICINE | Facility: CLINIC | Age: 64
End: 2024-02-05
Payer: COMMERCIAL

## 2024-02-05 VITALS
DIASTOLIC BLOOD PRESSURE: 54 MMHG | OXYGEN SATURATION: 97 % | HEART RATE: 70 BPM | SYSTOLIC BLOOD PRESSURE: 132 MMHG | HEIGHT: 61 IN | BODY MASS INDEX: 47.31 KG/M2 | WEIGHT: 250.6 LBS | TEMPERATURE: 97.6 F

## 2024-02-05 DIAGNOSIS — E55.9 VITAMIN D DEFICIENCY: ICD-10-CM

## 2024-02-05 DIAGNOSIS — Z12.31 ENCOUNTER FOR SCREENING MAMMOGRAM FOR MALIGNANT NEOPLASM OF BREAST: ICD-10-CM

## 2024-02-05 DIAGNOSIS — F41.9 ANXIETY: Primary | ICD-10-CM

## 2024-02-05 DIAGNOSIS — E03.9 HYPOTHYROIDISM, UNSPECIFIED TYPE: ICD-10-CM

## 2024-02-05 DIAGNOSIS — R06.00 DYSPNEA, UNSPECIFIED TYPE: ICD-10-CM

## 2024-02-05 DIAGNOSIS — J42 CHRONIC BRONCHITIS, UNSPECIFIED CHRONIC BRONCHITIS TYPE: ICD-10-CM

## 2024-02-05 LAB
25(OH)D3 SERPL-MCNC: 23.3 NG/ML (ref 30–100)
ALBUMIN SERPL-MCNC: 4.2 G/DL (ref 3.5–5.2)
ALBUMIN/GLOB SERPL: 1.4 G/DL
ALP SERPL-CCNC: 125 U/L (ref 39–117)
ALT SERPL W P-5'-P-CCNC: 16 U/L (ref 1–33)
ANION GAP SERPL CALCULATED.3IONS-SCNC: 9 MMOL/L (ref 5–15)
AST SERPL-CCNC: 16 U/L (ref 1–32)
BASOPHILS # BLD AUTO: 0.08 10*3/MM3 (ref 0–0.2)
BASOPHILS NFR BLD AUTO: 0.7 % (ref 0–1.5)
BILIRUB SERPL-MCNC: 0.2 MG/DL (ref 0–1.2)
BUN SERPL-MCNC: 17 MG/DL (ref 8–23)
BUN/CREAT SERPL: 16.3 (ref 7–25)
CALCIUM SPEC-SCNC: 9.8 MG/DL (ref 8.6–10.5)
CHLORIDE SERPL-SCNC: 104 MMOL/L (ref 98–107)
CO2 SERPL-SCNC: 28 MMOL/L (ref 22–29)
CREAT SERPL-MCNC: 1.04 MG/DL (ref 0.57–1)
DEPRECATED RDW RBC AUTO: 42.8 FL (ref 37–54)
EGFRCR SERPLBLD CKD-EPI 2021: 61.3 ML/MIN/1.73
EOSINOPHIL # BLD AUTO: 0.15 10*3/MM3 (ref 0–0.4)
EOSINOPHIL NFR BLD AUTO: 1.4 % (ref 0.3–6.2)
ERYTHROCYTE [DISTWIDTH] IN BLOOD BY AUTOMATED COUNT: 12.7 % (ref 12.3–15.4)
GLOBULIN UR ELPH-MCNC: 3 GM/DL
GLUCOSE SERPL-MCNC: 107 MG/DL (ref 65–99)
HBA1C MFR BLD: 6.5 % (ref 4.8–5.6)
HCT VFR BLD AUTO: 37.3 % (ref 34–46.6)
HGB BLD-MCNC: 11.8 G/DL (ref 12–15.9)
IMM GRANULOCYTES # BLD AUTO: 0.04 10*3/MM3 (ref 0–0.05)
IMM GRANULOCYTES NFR BLD AUTO: 0.4 % (ref 0–0.5)
LYMPHOCYTES # BLD AUTO: 1.87 10*3/MM3 (ref 0.7–3.1)
LYMPHOCYTES NFR BLD AUTO: 16.9 % (ref 19.6–45.3)
MCH RBC QN AUTO: 29.3 PG (ref 26.6–33)
MCHC RBC AUTO-ENTMCNC: 31.6 G/DL (ref 31.5–35.7)
MCV RBC AUTO: 92.6 FL (ref 79–97)
MONOCYTES # BLD AUTO: 0.85 10*3/MM3 (ref 0.1–0.9)
MONOCYTES NFR BLD AUTO: 7.7 % (ref 5–12)
NEUTROPHILS NFR BLD AUTO: 72.9 % (ref 42.7–76)
NEUTROPHILS NFR BLD AUTO: 8.09 10*3/MM3 (ref 1.7–7)
NRBC BLD AUTO-RTO: 0 /100 WBC (ref 0–0.2)
PLATELET # BLD AUTO: 317 10*3/MM3 (ref 140–450)
PMV BLD AUTO: 11.5 FL (ref 6–12)
POTASSIUM SERPL-SCNC: 4.4 MMOL/L (ref 3.5–5.2)
PROT SERPL-MCNC: 7.2 G/DL (ref 6–8.5)
RBC # BLD AUTO: 4.03 10*6/MM3 (ref 3.77–5.28)
SODIUM SERPL-SCNC: 141 MMOL/L (ref 136–145)
T4 FREE SERPL-MCNC: 0.53 NG/DL (ref 0.93–1.7)
TSH SERPL DL<=0.05 MIU/L-ACNC: 29.6 UIU/ML (ref 0.27–4.2)
WBC NRBC COR # BLD AUTO: 11.08 10*3/MM3 (ref 3.4–10.8)

## 2024-02-05 PROCEDURE — 82306 VITAMIN D 25 HYDROXY: CPT | Performed by: INTERNAL MEDICINE

## 2024-02-05 PROCEDURE — 1160F RVW MEDS BY RX/DR IN RCRD: CPT | Performed by: INTERNAL MEDICINE

## 2024-02-05 PROCEDURE — 84443 ASSAY THYROID STIM HORMONE: CPT | Performed by: INTERNAL MEDICINE

## 2024-02-05 PROCEDURE — 80053 COMPREHEN METABOLIC PANEL: CPT | Performed by: INTERNAL MEDICINE

## 2024-02-05 PROCEDURE — 83036 HEMOGLOBIN GLYCOSYLATED A1C: CPT | Performed by: INTERNAL MEDICINE

## 2024-02-05 PROCEDURE — 85025 COMPLETE CBC W/AUTO DIFF WBC: CPT | Performed by: INTERNAL MEDICINE

## 2024-02-05 PROCEDURE — 99214 OFFICE O/P EST MOD 30 MIN: CPT | Performed by: INTERNAL MEDICINE

## 2024-02-05 PROCEDURE — 1159F MED LIST DOCD IN RCRD: CPT | Performed by: INTERNAL MEDICINE

## 2024-02-05 PROCEDURE — 84439 ASSAY OF FREE THYROXINE: CPT | Performed by: INTERNAL MEDICINE

## 2024-02-05 RX ORDER — BUPROPION HYDROCHLORIDE 300 MG/1
300 TABLET ORAL DAILY
Qty: 90 TABLET | Refills: 1 | Status: SHIPPED | OUTPATIENT
Start: 2024-02-05

## 2024-02-05 RX ORDER — IPRATROPIUM BROMIDE AND ALBUTEROL 20; 100 UG/1; UG/1
2 SPRAY, METERED RESPIRATORY (INHALATION)
Qty: 12 G | Refills: 0 | Status: SHIPPED | OUTPATIENT
Start: 2024-02-05

## 2024-02-05 RX ORDER — ATORVASTATIN CALCIUM 20 MG/1
20 TABLET, FILM COATED ORAL DAILY
Qty: 90 TABLET | Refills: 1 | Status: SHIPPED | OUTPATIENT
Start: 2024-02-05

## 2024-02-05 NOTE — PROGRESS NOTES
"Chief Complaint  Stress (1 month f/u on Wellbutrin  mg QD seems to be working good.), COPD (Pt states that the Combivent has been working good. Pt was told that by the pharmacy that medication will no longer be on the market. /Pt states that her insurance has denied her getting Oxygen. Pt has been using her friends Oxygen, which seems to help. Pt having dizziness and trouble breathing, and SOB./Pt has been scheduled for Sleep Study 2/23/2024.), and Hypothyroidism (Pt has been taking her medication)    Subjective      COPD    Hypothyroidism      Denise Brito is a 63 y.o. female who presents to [facility] with a past medical history of  Past Medical History:   Diagnosis Date    Alcoholism     Anxiety 2012    Arthritis 1995    Asthma     Bipolar disorder 2012    CHF (congestive heart failure)     COPD (chronic obstructive pulmonary disease)     Depression 2012    Emphysema lung     Migraine headache     Night sweats 2011    Sexually transmitted disease 1988    Sinusitis 01/26/2016    SOB (shortness of breath) 2005    Squamous cell carcinoma in situ     Thyroid disorder     problems     Working with a   They are looking to get her hep at home  States that she has started taking her thyroid medicine  Still struggling with her breathing  Likes the combivent    She has gotten the cane and the shower chair    Reviewed CXR and CT chest    She is getting her teeth taken care of as well        Objective   Vital Signs:   Vitals:    02/05/24 1121 02/05/24 1123   BP: 132/54    Pulse: 70    Temp: 97.6 °F (36.4 °C)    TempSrc: Temporal    SpO2: 93% 97%  Comment: oxygen; 2L   Weight: 114 kg (250 lb 9.6 oz)    Height: 154.9 cm (61\")    PainSc: 0-No pain      Body mass index is 47.35 kg/m².    Wt Readings from Last 3 Encounters:   02/05/24 114 kg (250 lb 9.6 oz)   12/13/23 113 kg (249 lb 12.8 oz)   09/30/22 110 kg (243 lb 9.6 oz)     BP Readings from Last 3 " Clinical picture concerning for extraparenchymal symptoms in setting of increased antipsychotic dosing and history of similar symptoms with Geodon in the past, will hold Abilify and Seroquel, consult psychiatry, MRI brain ordered and neurology on consult   Encounters:   02/05/24 132/54   12/13/23 126/90   09/30/22 126/76       Health Maintenance   Topic Date Due    MAMMOGRAM  09/21/2017    HEPATITIS C SCREENING  02/05/2024 (Originally 6/11/2021)    COLORECTAL CANCER SCREENING  02/05/2024 (Originally 1960)    COVID-19 Vaccine (1) 02/07/2024 (Originally 1/29/1961)    INFLUENZA VACCINE  03/31/2024 (Originally 8/1/2023)    ANNUAL PHYSICAL  05/05/2024 (Originally 6/11/2021)    ZOSTER VACCINE (1 of 2) 12/13/2024 (Originally 7/29/2012)    PAP SMEAR  02/04/2025 (Originally 6/11/2021)    TDAP/TD VACCINES (1 - Tdap) 02/05/2025 (Originally 7/29/1981)    BMI FOLLOWUP  12/13/2024    Pneumococcal Vaccine 0-64  Completed       Physical Exam  Vitals reviewed.   Constitutional:       Appearance: Normal appearance. She is well-developed. She is obese.   HENT:      Head: Normocephalic and atraumatic.      Right Ear: External ear normal.      Left Ear: External ear normal.   Eyes:      Conjunctiva/sclera: Conjunctivae normal.      Pupils: Pupils are equal, round, and reactive to light.   Cardiovascular:      Rate and Rhythm: Normal rate and regular rhythm.      Heart sounds: No murmur heard.     No friction rub. No gallop.   Pulmonary:      Effort: Pulmonary effort is normal.      Breath sounds: Normal breath sounds. Wheezing present. No rhonchi.   Skin:     General: Skin is warm and dry.   Neurological:      Mental Status: She is alert and oriented to person, place, and time.   Psychiatric:         Mood and Affect: Affect normal.         Behavior: Behavior normal.         Thought Content: Thought content normal.            Result Review :  The following data was reviewed by: Laverne Tristan MD on 02/05/2024:      Procedures        Assessment and Plan   Diagnoses and all orders for this visit:    1. Anxiety (Primary)  Comments:  doing well on wellbutrin will increase to 300mg daily; warned of side effects  Orders:  -     Comprehensive Metabolic Panel  -     CBC & Differential  -      TSH  -     T4, Free  -     Complete PFT - Pre & Post Bronchodilator; Future  -     Vitamin D,25-Hydroxy  -     Hemoglobin A1c  -     Adult Transthoracic Echo Complete w/ Color, Spectral and Contrast if necessary per protocol; Future    2. Encounter for screening mammogram for malignant neoplasm of breast  -     Mammo Screening Digital Tomosynthesis Bilateral With CAD    3. Chronic bronchitis, unspecified chronic bronchitis type  Comments:  encouraged her to take the bevespi daily  Orders:  -     Comprehensive Metabolic Panel  -     CBC & Differential  -     TSH  -     T4, Free  -     Complete PFT - Pre & Post Bronchodilator; Future  -     Vitamin D,25-Hydroxy  -     Hemoglobin A1c  -     Adult Transthoracic Echo Complete w/ Color, Spectral and Contrast if necessary per protocol; Future    4. Hypothyroidism, unspecified type  Comments:  will check levels and adjust as needed  Orders:  -     TSH  -     T4, Free  -     Hemoglobin A1c    5. Vitamin D deficiency  Comments:  will check levels and adjust as needed  Orders:  -     Vitamin D,25-Hydroxy    6. Dyspnea, unspecified type  Comments:  will get echo  Orders:  -     Comprehensive Metabolic Panel  -     CBC & Differential  -     TSH  -     T4, Free  -     Complete PFT - Pre & Post Bronchodilator; Future  -     Vitamin D,25-Hydroxy  -     Hemoglobin A1c  -     Adult Transthoracic Echo Complete w/ Color, Spectral and Contrast if necessary per protocol; Future    Other orders  -     ipratropium-albuterol (Combivent Respimat)  MCG/ACT inhaler; Inhale 2 puffs 4 (Four) Times a Day.  Dispense: 12 g; Refill: 0  -     atorvastatin (Lipitor) 20 MG tablet; Take 1 tablet by mouth Daily.  Dispense: 90 tablet; Refill: 1  -     buPROPion XL (Wellbutrin XL) 300 MG 24 hr tablet; Take 1 tablet by mouth Daily.  Dispense: 90 tablet; Refill: 1                  FOLLOW UP  Return in about 2 months (around 4/5/2024).  Patient was given instructions and counseling regarding her  condition or for health maintenance advice. Please see specific information pulled into the AVS if appropriate.       Laverne Tristan MD  02/05/24  12:06 EST    CURRENT & DISCONTINUED MEDICATIONS  Current Outpatient Medications   Medication Instructions    albuterol sulfate  (90 Base) MCG/ACT inhaler 2 puffs, Inhalation, Every 4 Hours PRN, for wheezing    atorvastatin (LIPITOR) 20 mg, Oral, Daily    Bevespi Aerosphere 9-4.8 MCG/ACT aerosol INHALE 2 SPRAYS TWICE DAILY    buPROPion XL (WELLBUTRIN XL) 300 mg, Oral, Daily    ipratropium-albuterol (Combivent Respimat)  MCG/ACT inhaler 2 puffs, Inhalation, 4 Times Daily - RT    levothyroxine (SYNTHROID) 50 mcg, Oral, Daily    vitamin D (ERGOCALCIFEROL) 50,000 Units, Oral, Weekly       Medications Discontinued During This Encounter   Medication Reason    ipratropium-albuterol (Combivent Respimat)  MCG/ACT inhaler Reorder    buPROPion XL (Wellbutrin XL) 150 MG 24 hr tablet Reorder

## 2024-02-06 RX ORDER — LEVOTHYROXINE SODIUM 88 UG/1
88 TABLET ORAL DAILY
Qty: 90 TABLET | Refills: 1 | Status: SHIPPED | OUTPATIENT
Start: 2024-02-06

## 2024-02-23 ENCOUNTER — OFFICE VISIT (OUTPATIENT)
Dept: SLEEP MEDICINE | Facility: HOSPITAL | Age: 64
End: 2024-02-23
Payer: COMMERCIAL

## 2024-02-23 VITALS
BODY MASS INDEX: 46.16 KG/M2 | HEIGHT: 61 IN | HEART RATE: 68 BPM | SYSTOLIC BLOOD PRESSURE: 135 MMHG | OXYGEN SATURATION: 96 % | DIASTOLIC BLOOD PRESSURE: 62 MMHG | WEIGHT: 244.5 LBS

## 2024-02-23 DIAGNOSIS — I50.9 CONGESTIVE HEART FAILURE, UNSPECIFIED HF CHRONICITY, UNSPECIFIED HEART FAILURE TYPE: Primary | ICD-10-CM

## 2024-02-23 DIAGNOSIS — J42 CHRONIC BRONCHITIS, UNSPECIFIED CHRONIC BRONCHITIS TYPE: ICD-10-CM

## 2024-02-23 DIAGNOSIS — G47.33 OSA (OBSTRUCTIVE SLEEP APNEA): ICD-10-CM

## 2024-02-23 DIAGNOSIS — R06.01 ORTHOPNEA: ICD-10-CM

## 2024-02-23 PROCEDURE — G0463 HOSPITAL OUTPT CLINIC VISIT: HCPCS

## 2024-02-23 NOTE — PROGRESS NOTES
Reason for Consultation: Sleep apnea        Patient Care Team:  Laverne Tristan MD as PCP - General (Internal Medicine)  Neema Webb MD, MPH as Consulting Physician (Sleep Medicine)      History of present illness:    Thank you for asking me to see your patient.  The patient is a 63 y.o. female is at the visit unaccompanied.  She tells me that she has had orthopnea and hypoxemia for many years.  She continues to be a smoker and also has COPD.  She was to get a sleep study because she wakes up short of breath and has to sleep sitting up.  She says she is now afraid to go to sleep.  She typically goes to bed about 2 AM and gets up at 11 AM and might of slept 6 hours and she is sleepy.  She had a sleep test 10 years ago and apparently had borderline sleep apnea at that time.  She gained 50 pounds in the past 5 years snores loudly in all positions and has had gasp arousals and choking arousals.  No one currently sleeps in her bedroom with her.  She gets up go to the bathroom about 5 times per night and has trouble both falling and staying asleep.  She continues to smoke and has done so since age 15 she does not use any alcohol and has 6 caffeinated beverages per day she has insomnia anxiety depression heart failure high blood pressure and COPD.    [place]: 15    Data Reviewed: Reviewed her chart and sleep questionnaire.      PMH:  Past Medical History:   Diagnosis Date    Alcoholism     Anxiety 2012    Arthritis 1995    Asthma     Bipolar disorder 2012    CHF (congestive heart failure)     COPD (chronic obstructive pulmonary disease)     Depression 2012    Emphysema lung     Hypertension     Insomnia     Migraine headache     Night sweats 2011    Sexually transmitted disease 1988    Sinusitis 01/26/2016    SOB (shortness of breath) 2005    Squamous cell carcinoma in situ     Thyroid disorder     problems          Allergies:  Patient has no known allergies.     Medication Review:   Current Outpatient Medications  "on File Prior to Visit   Medication Sig Dispense Refill    albuterol sulfate  (90 Base) MCG/ACT inhaler Inhale 2 puffs Every 4 (Four) Hours As Needed for Wheezing. for wheezing 18 g 3    atorvastatin (Lipitor) 20 MG tablet Take 1 tablet by mouth Daily. 90 tablet 1    Bevespi Aerosphere 9-4.8 MCG/ACT aerosol INHALE 2 SPRAYS TWICE DAILY 10.7 g 2    buPROPion XL (Wellbutrin XL) 300 MG 24 hr tablet Take 1 tablet by mouth Daily. 90 tablet 1    ipratropium-albuterol (Combivent Respimat)  MCG/ACT inhaler Inhale 2 puffs 4 (Four) Times a Day. 12 g 0    levothyroxine (Synthroid) 88 MCG tablet Take 1 tablet by mouth Daily. 90 tablet 1    vitamin D (ERGOCALCIFEROL) 1.25 MG (11962 UT) capsule capsule Take 1 capsule by mouth 1 (One) Time Per Week. 12 capsule 0     No current facility-administered medications on file prior to visit.         Vital Signs:    Vitals:    02/23/24 1100   BP: 135/62   Pulse: 68   SpO2: 96%   Weight: 111 kg (244 lb 8 oz)   Height: 154.9 cm (61\")        Body mass index is 46.2 kg/m².  Neck Circumference: 16.5 inches      Physical Exam:    Constitutional:  Well developed 63 y.o. female that appears in no apparent distress.  Awake & oriented times 3.  Normal mood with normal recent and remote memory and normal judgement.  Eyes:  Conjunctivae normal.  Oropharynx: Moist mucous membranes without exudate and Mallampati 4  Neck: Trachea midline  Respiratory: Effort is not labored  Cardiovascular: Radial pulse regular  Musculoskeletal: Gait appears normal, no digital clubbing evident, no pre-tibial edema        Impression:   Encounter Diagnoses   Name Primary?    Congestive heart failure, unspecified HF chronicity, unspecified heart failure type Yes    Chronic bronchitis, unspecified chronic bronchitis type     IMAN (obstructive sleep apnea)     Orthopnea      Patient's BMI is Body mass index is 46.2 kg/m².    Because of her history of COPD and orthopnea in addition to likely sleep apnea she will need " to be studied in the lab.  She might even need a recliner.    Plan:    In lab split-night polysomnogram.    The patient should practice good sleep hygiene measures.      The weight loss might be beneficial in this patient who has a Body mass index is 46.2 kg/m².      Pathophysiology of IMAN described to the patient.  Cardiovascular complications of untreated IMAN also reviewed.      The patient was cautioned about the dangers of drowsy driving.    Alex Webb MD  Sleep Medicine  02/23/24  12:52 EST

## 2024-02-26 RX ORDER — GLYCOPYRROLATE AND FORMOTEROL FUMARATE 9; 4.8 UG/1; UG/1
AEROSOL, METERED RESPIRATORY (INHALATION)
Qty: 10.7 G | Refills: 2 | Status: SHIPPED | OUTPATIENT
Start: 2024-02-26

## 2024-03-11 ENCOUNTER — HOSPITAL ENCOUNTER (OUTPATIENT)
Dept: RESPIRATORY THERAPY | Facility: HOSPITAL | Age: 64
Discharge: HOME | End: 2024-03-11
Payer: COMMERCIAL

## 2024-03-11 DIAGNOSIS — R06.00 DYSPNEA, UNSPECIFIED TYPE: ICD-10-CM

## 2024-03-11 DIAGNOSIS — J42 CHRONIC BRONCHITIS, UNSPECIFIED CHRONIC BRONCHITIS TYPE: ICD-10-CM

## 2024-03-11 DIAGNOSIS — F41.9 ANXIETY: ICD-10-CM

## 2024-03-11 PROCEDURE — 94060 EVALUATION OF WHEEZING: CPT

## 2024-03-11 PROCEDURE — 94726 PLETHYSMOGRAPHY LUNG VOLUMES: CPT

## 2024-03-11 PROCEDURE — 94729 DIFFUSING CAPACITY: CPT

## 2024-03-11 RX ORDER — ALBUTEROL SULFATE 2.5 MG/3ML
2.5 SOLUTION RESPIRATORY (INHALATION) ONCE
Status: COMPLETED | OUTPATIENT
Start: 2024-03-11 | End: 2024-03-11

## 2024-03-11 RX ADMIN — ALBUTEROL SULFATE 2.5 MG: 2.5 SOLUTION RESPIRATORY (INHALATION) at 09:03

## 2024-03-20 ENCOUNTER — TELEPHONE (OUTPATIENT)
Dept: INTERNAL MEDICINE | Facility: CLINIC | Age: 64
End: 2024-03-20
Payer: COMMERCIAL

## 2024-03-20 NOTE — TELEPHONE ENCOUNTER
Caller: Denise Brito    Relationship: Self    Best call back number: [phone]     What was the call regarding: PATIENT STATES SHE WAS RETURNING A CALL FOR YOVANY FOR TEST RESULTS.

## 2024-04-08 ENCOUNTER — HOSPITAL ENCOUNTER (OUTPATIENT)
Dept: CARDIOLOGY | Facility: HOSPITAL | Age: 64
Discharge: HOME | End: 2024-04-08
Admitting: INTERNAL MEDICINE
Payer: COMMERCIAL

## 2024-04-08 DIAGNOSIS — J42 CHRONIC BRONCHITIS, UNSPECIFIED CHRONIC BRONCHITIS TYPE: ICD-10-CM

## 2024-04-08 DIAGNOSIS — F41.9 ANXIETY: ICD-10-CM

## 2024-04-08 DIAGNOSIS — R06.00 DYSPNEA, UNSPECIFIED TYPE: ICD-10-CM

## 2024-04-08 PROCEDURE — 93306 TTE W/DOPPLER COMPLETE: CPT

## 2024-04-11 LAB
BH CV ECHO MEAS - AI P1/2T: 437.3 MSEC
BH CV ECHO MEAS - AO MAX PG: 12.5 MMHG
BH CV ECHO MEAS - AO MEAN PG: 7 MMHG
BH CV ECHO MEAS - AO ROOT DIAM: 3.5 CM
BH CV ECHO MEAS - AO V2 MAX: 177 CM/SEC
BH CV ECHO MEAS - AO V2 VTI: 36.4 CM
BH CV ECHO MEAS - EDV(CUBED): 50.7 ML
BH CV ECHO MEAS - EDV(MOD-SP2): 32.1 ML
BH CV ECHO MEAS - EDV(MOD-SP4): 36.6 ML
BH CV ECHO MEAS - EF(MOD-BP): 57.8 %
BH CV ECHO MEAS - EF(MOD-SP2): 65.1 %
BH CV ECHO MEAS - EF(MOD-SP4): 54.6 %
BH CV ECHO MEAS - ESV(CUBED): 19.7 ML
BH CV ECHO MEAS - ESV(MOD-SP2): 11.2 ML
BH CV ECHO MEAS - ESV(MOD-SP4): 16.6 ML
BH CV ECHO MEAS - FS: 27 %
BH CV ECHO MEAS - IVS/LVPW: 0.92 CM
BH CV ECHO MEAS - IVSD: 1.2 CM
BH CV ECHO MEAS - LA DIMENSION: 4 CM
BH CV ECHO MEAS - LAT PEAK E' VEL: 8.2 CM/SEC
BH CV ECHO MEAS - LV DIASTOLIC VOL/BSA (35-75): 18.1 CM2
BH CV ECHO MEAS - LV MASS(C)D: 156.7 GRAMS
BH CV ECHO MEAS - LV SYSTOLIC VOL/BSA (12-30): 8.2 CM2
BH CV ECHO MEAS - LVIDD: 3.7 CM
BH CV ECHO MEAS - LVIDS: 2.7 CM
BH CV ECHO MEAS - LVOT AREA: 3.1 CM2
BH CV ECHO MEAS - LVOT DIAM: 2 CM
BH CV ECHO MEAS - LVPWD: 1.3 CM
BH CV ECHO MEAS - MED PEAK E' VEL: 8.4 CM/SEC
BH CV ECHO MEAS - MV A MAX VEL: 121 CM/SEC
BH CV ECHO MEAS - MV DEC TIME: 0.24 SEC
BH CV ECHO MEAS - MV E MAX VEL: 95.4 CM/SEC
BH CV ECHO MEAS - MV E/A: 0.79
BH CV ECHO MEAS - SI(MOD-SP2): 10.4 ML/M2
BH CV ECHO MEAS - SI(MOD-SP4): 9.9 ML/M2
BH CV ECHO MEAS - SV(MOD-SP2): 20.9 ML
BH CV ECHO MEAS - SV(MOD-SP4): 20 ML
BH CV ECHO MEAS - TAPSE (>1.6): 2.29 CM
BH CV ECHO MEASUREMENTS AVERAGE E/E' RATIO: 11.49
IVRT: 80 MS
LEFT ATRIUM VOLUME INDEX: 29.6 ML/M2

## 2024-04-15 ENCOUNTER — OFFICE VISIT (OUTPATIENT)
Dept: INTERNAL MEDICINE | Facility: CLINIC | Age: 64
End: 2024-04-15
Payer: COMMERCIAL

## 2024-04-15 VITALS
BODY MASS INDEX: 46.33 KG/M2 | SYSTOLIC BLOOD PRESSURE: 158 MMHG | RESPIRATION RATE: 20 BRPM | HEART RATE: 70 BPM | DIASTOLIC BLOOD PRESSURE: 90 MMHG | WEIGHT: 245.4 LBS | TEMPERATURE: 97.4 F | HEIGHT: 61 IN | OXYGEN SATURATION: 97 %

## 2024-04-15 DIAGNOSIS — J42 CHRONIC BRONCHITIS, UNSPECIFIED CHRONIC BRONCHITIS TYPE: ICD-10-CM

## 2024-04-15 DIAGNOSIS — Z59.82 TRANSPORTATION INSECURITY: Primary | ICD-10-CM

## 2024-04-15 DIAGNOSIS — G47.33 OSA (OBSTRUCTIVE SLEEP APNEA): ICD-10-CM

## 2024-04-15 DIAGNOSIS — E55.9 VITAMIN D DEFICIENCY: ICD-10-CM

## 2024-04-15 DIAGNOSIS — F41.9 ANXIETY: ICD-10-CM

## 2024-04-15 DIAGNOSIS — L60.2 LONG TOENAIL: ICD-10-CM

## 2024-04-15 DIAGNOSIS — E03.9 HYPOTHYROIDISM, UNSPECIFIED TYPE: ICD-10-CM

## 2024-04-15 DIAGNOSIS — Z11.59 NEED FOR HEPATITIS C SCREENING TEST: ICD-10-CM

## 2024-04-15 RX ORDER — ERGOCALCIFEROL 1.25 MG/1
50000 CAPSULE ORAL WEEKLY
Qty: 12 CAPSULE | Refills: 0 | Status: SHIPPED | OUTPATIENT
Start: 2024-04-15

## 2024-04-15 RX ORDER — LISINOPRIL 10 MG/1
10 TABLET ORAL DAILY
Qty: 90 TABLET | Refills: 1 | Status: SHIPPED | OUTPATIENT
Start: 2024-04-15

## 2024-04-15 RX ORDER — IBUPROFEN 600 MG/1
600 TABLET ORAL EVERY 8 HOURS PRN
Qty: 90 TABLET | Refills: 1 | Status: SHIPPED | OUTPATIENT
Start: 2024-04-15

## 2024-04-15 RX ORDER — LEVOTHYROXINE SODIUM 112 UG/1
112 TABLET ORAL DAILY
Qty: 90 TABLET | Refills: 1 | Status: SHIPPED | OUTPATIENT
Start: 2024-04-15

## 2024-04-15 RX ORDER — SENNOSIDES 8.6 MG
650 CAPSULE ORAL EVERY 8 HOURS PRN
Qty: 90 TABLET | Refills: 1 | Status: SHIPPED | OUTPATIENT
Start: 2024-04-15

## 2024-04-15 SDOH — ECONOMIC STABILITY - TRANSPORTATION SECURITY: TRANSPORTATION INSECURITY: Z59.82

## 2024-04-15 NOTE — PROGRESS NOTES
"Chief Complaint  Hypothyroidism, COPD, and Anxiety (2 month follow up )      Subjective      History of Present Illness  The patient presents for evaluation of multiple medical concerns.    The patient reports experiencing fatigue, which she attributes to multiple hospital admissions. She has been diagnosed with sleep apnea, however, she expresses reluctance to utilize the prescribed mask due to its inconvenience with her daily activities.    The patient is seeking a prescription for Motrin 800 for her headaches, which she believes may be increasing in frequency.    The patient has not been adhering to a regimen of vitamin D.    The patient reports an improvement in her respiratory function.    She has been under significant stress. She has talked to our  about rides.      She has been watching her diet. She eats her mom's meals. Her diet includes a lot of meat and potatoes, but not a lot of vegetables and fruits.     Her mother had stage 4 kidney failure.         Objective   Vital Signs:   Vitals:    04/15/24 1205   BP: 158/90   BP Location: Left arm   Patient Position: Sitting   Cuff Size: Adult   Pulse: 70   Resp: 20   Temp: 97.4 °F (36.3 °C)   TempSrc: Temporal   SpO2: 97%   Weight: 111 kg (245 lb 6.4 oz)   Height: 154.9 cm (60.98\")     Body mass index is 46.39 kg/m².    Wt Readings from Last 3 Encounters:   04/15/24 111 kg (245 lb 6.4 oz)   02/23/24 111 kg (244 lb 8 oz)   02/05/24 114 kg (250 lb 9.6 oz)     BP Readings from Last 3 Encounters:   04/15/24 158/90   02/23/24 135/62   02/05/24 132/54       Health Maintenance   Topic Date Due    MAMMOGRAM  09/21/2017    HEPATITIS C SCREENING  Never done    COLORECTAL CANCER SCREENING  04/16/2024 (Originally 1960)    COVID-19 Vaccine (1 - 2023-24 season) 04/17/2024 (Originally 9/1/2023)    ANNUAL PHYSICAL  05/05/2024 (Originally 6/11/2021)    ZOSTER VACCINE (1 of 2) 12/13/2024 (Originally 7/29/2012)    PAP SMEAR  02/04/2025 (Originally 6/11/2021)    " TDAP/TD VACCINES (1 - Tdap) 02/05/2025 (Originally 7/29/1981)    RSV Vaccine - Adults (1 - 1-dose 60+ series) 04/15/2025 (Originally 7/29/2022)    INFLUENZA VACCINE  08/01/2024    BMI FOLLOWUP  12/13/2024    LUNG CANCER SCREENING  01/05/2025    Pneumococcal Vaccine 0-64  Completed       Physical Exam  Vitals reviewed.   Constitutional:       Appearance: Normal appearance. She is well-developed.   HENT:      Head: Normocephalic and atraumatic.      Right Ear: External ear normal.      Left Ear: External ear normal.   Eyes:      Conjunctiva/sclera: Conjunctivae normal.      Pupils: Pupils are equal, round, and reactive to light.   Cardiovascular:      Rate and Rhythm: Normal rate and regular rhythm.      Heart sounds: No murmur heard.     No friction rub. No gallop.   Pulmonary:      Effort: Pulmonary effort is normal.      Breath sounds: Normal breath sounds. No wheezing or rhonchi.   Skin:     General: Skin is warm and dry.   Neurological:      Mental Status: She is alert and oriented to person, place, and time.   Psychiatric:         Mood and Affect: Affect normal.         Behavior: Behavior normal.         Thought Content: Thought content normal.        Physical Exam        Result Review :  The following data was reviewed by: Laverne Tristan MD on 04/15/2024:         Results  Laboratory Studies  Vitamin D level improved but still low. Thyroid levels were stable.    Imaging  Echo showed possible diastolic heart failure and changes associated with high blood pressure.            Procedures            Assessment & Plan  Transportation insecurity    Need for hepatitis C screening test    Chronic bronchitis, unspecified chronic bronchitis type    Vitamin D deficiency    IMAN (obstructive sleep apnea)    Hypothyroidism, unspecified type    Anxiety    Long toenail      Orders Placed This Encounter   Procedures    Vitamin D,25-Hydroxy    T4, Free    TSH    Hepatitis C Antibody    Ambulatory Referral to Social Care  Services ([facility])    Ambulatory Referral to Podiatry     New Medications Ordered This Visit   Medications    ibuprofen (ADVIL,MOTRIN) 600 MG tablet     Sig: Take 1 tablet by mouth Every 8 (Eight) Hours As Needed for Mild Pain.     Dispense:  90 tablet     Refill:  1    acetaminophen (Tylenol 8 Hour Arthritis Pain) 650 MG 8 hr tablet     Sig: Take 1 tablet by mouth Every 8 (Eight) Hours As Needed for Mild Pain.     Dispense:  90 tablet     Refill:  1    vitamin D (ERGOCALCIFEROL) 1.25 MG (85552 UT) capsule capsule     Sig: Take 1 capsule by mouth 1 (One) Time Per Week.     Dispense:  12 capsule     Refill:  0    levothyroxine (Synthroid) 112 MCG tablet     Sig: Take 1 tablet by mouth Daily.     Dispense:  90 tablet     Refill:  1    lisinopril (PRINIVIL,ZESTRIL) 10 MG tablet     Sig: Take 1 tablet by mouth Daily.     Dispense:  90 tablet     Refill:  1          Assessment & Plan  1. Headaches.  A prescription for Motrin 600 mg, to be taken with food and water, will be provided.    2. Vitamin D deficiency.  The patient's Vitamin D levels have shown improvement, albeit still low. A refill of the patient's Vitamin D prescription will be provided.    3. Hypothyroidism.  The patient's Synthroid dosage will be increased to 112 mcg as this is what she has been on previously and this dose worked well for her.    4. Hypertension.  The patient's blood pressure is elevated during this visit. A low dose of blood pressure medication will be prescribed.    Patient or patient representative verbalized consent for the use of Ambient Listening during the visit with  Laverne Tristan MD for chart documentation. 4/15/2024  12:39 EDT      FOLLOW UP  Return in about 3 months (around 7/15/2024).  Patient was given instructions and counseling regarding her condition or for health maintenance advice. Please see specific information pulled into the AVS if appropriate.     Laverne Tristan MD  04/15/24  12:53 EDT    CURRENT &  DISCONTINUED MEDICATIONS  Current Outpatient Medications   Medication Instructions    acetaminophen (TYLENOL 8 HOUR ARTHRITIS PAIN) 650 mg, Oral, Every 8 Hours PRN    albuterol sulfate  (90 Base) MCG/ACT inhaler 2 puffs, Inhalation, Every 4 Hours PRN, for wheezing    atorvastatin (LIPITOR) 20 mg, Oral, Daily    Bevespi Aerosphere 9-4.8 MCG/ACT aerosol INHALE 2 sprays TWICE DAILY    buPROPion XL (WELLBUTRIN XL) 300 mg, Oral, Daily    ibuprofen (ADVIL,MOTRIN) 600 mg, Oral, Every 8 Hours PRN    ipratropium-albuterol (Combivent Respimat)  MCG/ACT inhaler 2 puffs, Inhalation, 4 Times Daily - RT    levothyroxine (SYNTHROID) 112 mcg, Oral, Daily    lisinopril (PRINIVIL,ZESTRIL) 10 mg, Oral, Daily    vitamin D (ERGOCALCIFEROL) 50,000 Units, Oral, Weekly       Medications Discontinued During This Encounter   Medication Reason    vitamin D (ERGOCALCIFEROL) 1.25 MG (96886 UT) capsule capsule     levothyroxine (Synthroid) 88 MCG tablet Reorder

## 2024-04-16 ENCOUNTER — REFERRAL TRIAGE (OUTPATIENT)
Age: 64
End: 2024-04-16
Payer: COMMERCIAL

## 2024-04-17 ENCOUNTER — PATIENT OUTREACH (OUTPATIENT)
Age: 64
End: 2024-04-17
Payer: COMMERCIAL

## 2024-04-17 NOTE — OUTREACH NOTE
MSW left VM with patient to discuss transportation resources. MSW awaiting call back.    Marisela UMANZOR -   Ambulatory Case Management    4/17/2024, 13:22 EDT

## 2024-04-18 ENCOUNTER — PATIENT OUTREACH (OUTPATIENT)
Age: 64
End: 2024-04-18
Payer: COMMERCIAL

## 2024-04-18 NOTE — OUTREACH NOTE
Social Work Assessment  Questions/Answers      Flowsheet Row Most Recent Value   People in Home friend(s)   Current Living Arrangements home   In the past 12 months has the electric, gas, oil, or water company threatened to shut off services in your home? No   Primary Care Provided by self   Source of Income disability   Application for Public Assistance obtained public assistance pending number          SDOH updated and reviewed with the patient during this program:  --      Disabilities      --     Employment: Not At Risk (4/18/2024)    Employment     Do you want help finding or keeping work or a job?: I do not need or want help      Financial Resource Strain: Medium Risk (10/5/2022)    Overall Financial Resource Strain (CARDIA)     Difficulty of Paying Living Expenses: Somewhat hard      --     Food Insecurity: No Food Insecurity (10/5/2022)    Hunger Vital Sign     Worried About Running Out of Food in the Last Year: Never true     Ran Out of Food in the Last Year: Never true      --     Housing Stability: Unknown (4/18/2024)    Housing Stability     Current Living Arrangements: home      --     Transportation Needs: No Transportation Needs (10/5/2022)    PRAPARE - Transportation     Lack of Transportation (Medical): No     Lack of Transportation (Non-Medical): No      --     Utilities: Not At Risk (4/18/2024)    [facility] Utilities     Threatened with loss of utilities: No     Patient Outreach    MSW spoke with patient to discuss resources for transportation. MSW educated on [employer]. Patient has utilized in the past, but does not prefer due to waiting on rides for . Patient is aware of resource though and MSW encouraged to use, so that patient doesn't miss appointments. Patient also given resources for [facility] used car program and for patient to continue to check for updated cars for sale on their website. Patient thankful for this resource.    Marisela UMANZOR -   Ambulatory Case  Management    4/18/2024, 12:47 EDT

## 2024-07-08 ENCOUNTER — HOSPITAL ENCOUNTER (OUTPATIENT)
Dept: MAMMOGRAPHY | Facility: HOSPITAL | Age: 64
Discharge: HOME | End: 2024-07-08
Admitting: INTERNAL MEDICINE
Payer: COMMERCIAL

## 2024-07-08 PROCEDURE — 77063 BREAST TOMOSYNTHESIS BI: CPT

## 2024-07-08 PROCEDURE — 77067 SCR MAMMO BI INCL CAD: CPT

## 2024-07-22 RX ORDER — IBUPROFEN 600 MG/1
600 TABLET ORAL EVERY 8 HOURS PRN
Qty: 90 TABLET | Refills: 1 | Status: SHIPPED | OUTPATIENT
Start: 2024-07-22

## 2024-07-22 RX ORDER — ATORVASTATIN CALCIUM 20 MG/1
20 TABLET, FILM COATED ORAL DAILY
Qty: 90 TABLET | Refills: 1 | Status: SHIPPED | OUTPATIENT
Start: 2024-07-22

## 2024-07-22 RX ORDER — BUPROPION HYDROCHLORIDE 300 MG/1
300 TABLET ORAL DAILY
Qty: 90 TABLET | Refills: 1 | Status: SHIPPED | OUTPATIENT
Start: 2024-07-22

## 2024-07-22 RX ORDER — SENNOSIDES 8.6 MG
650 CAPSULE ORAL EVERY 8 HOURS PRN
Qty: 90 TABLET | Refills: 1 | Status: SHIPPED | OUTPATIENT
Start: 2024-07-22

## 2024-07-30 ENCOUNTER — OFFICE VISIT (OUTPATIENT)
Dept: INTERNAL MEDICINE | Facility: CLINIC | Age: 64
End: 2024-07-30
Payer: COMMERCIAL

## 2024-07-30 DIAGNOSIS — E11.9 TYPE 2 DIABETES MELLITUS WITHOUT COMPLICATION, WITHOUT LONG-TERM CURRENT USE OF INSULIN: Primary | ICD-10-CM

## 2024-07-30 DIAGNOSIS — Z53.21 PATIENT LEFT WITHOUT BEING SEEN: Primary | ICD-10-CM

## 2024-07-30 NOTE — PROGRESS NOTES
The patient left the office before care was provided and did not complete the visit because of the wait time.

## 2024-07-31 ENCOUNTER — CLINICAL SUPPORT (OUTPATIENT)
Dept: INTERNAL MEDICINE | Facility: CLINIC | Age: 64
End: 2024-07-31
Payer: COMMERCIAL

## 2024-07-31 DIAGNOSIS — Z11.59 NEED FOR HEPATITIS C SCREENING TEST: ICD-10-CM

## 2024-07-31 DIAGNOSIS — I50.9 CONGESTIVE HEART FAILURE, UNSPECIFIED HF CHRONICITY, UNSPECIFIED HEART FAILURE TYPE: ICD-10-CM

## 2024-07-31 DIAGNOSIS — Z01.89 ENCOUNTER FOR LABORATORY TEST: ICD-10-CM

## 2024-07-31 DIAGNOSIS — E03.9 HYPOTHYROIDISM, UNSPECIFIED TYPE: Primary | ICD-10-CM

## 2024-07-31 DIAGNOSIS — E55.9 VITAMIN D DEFICIENCY: ICD-10-CM

## 2024-07-31 LAB
25(OH)D3 SERPL-MCNC: 28.6 NG/ML (ref 30–100)
ALBUMIN SERPL-MCNC: 4.1 G/DL (ref 3.5–5.2)
ALBUMIN/GLOB SERPL: 1.4 G/DL
ALP SERPL-CCNC: 122 U/L (ref 39–117)
ALT SERPL W P-5'-P-CCNC: 15 U/L (ref 1–33)
ANION GAP SERPL CALCULATED.3IONS-SCNC: 8.9 MMOL/L (ref 5–15)
AST SERPL-CCNC: 21 U/L (ref 1–32)
BASOPHILS # BLD AUTO: 0.07 10*3/MM3 (ref 0–0.2)
BASOPHILS NFR BLD AUTO: 0.6 % (ref 0–1.5)
BILIRUB SERPL-MCNC: 0.3 MG/DL (ref 0–1.2)
BUN SERPL-MCNC: 14 MG/DL (ref 8–23)
BUN/CREAT SERPL: 16.3 (ref 7–25)
CALCIUM SPEC-SCNC: 9.3 MG/DL (ref 8.6–10.5)
CHLORIDE SERPL-SCNC: 105 MMOL/L (ref 98–107)
CHOLEST SERPL-MCNC: 178 MG/DL (ref 0–200)
CO2 SERPL-SCNC: 26.1 MMOL/L (ref 22–29)
CREAT SERPL-MCNC: 0.86 MG/DL (ref 0.57–1)
DEPRECATED RDW RBC AUTO: 41.1 FL (ref 37–54)
EGFRCR SERPLBLD CKD-EPI 2021: 76.5 ML/MIN/1.73
EOSINOPHIL # BLD AUTO: 0.24 10*3/MM3 (ref 0–0.4)
EOSINOPHIL NFR BLD AUTO: 2.2 % (ref 0.3–6.2)
ERYTHROCYTE [DISTWIDTH] IN BLOOD BY AUTOMATED COUNT: 12.6 % (ref 12.3–15.4)
GLOBULIN UR ELPH-MCNC: 3 GM/DL
GLUCOSE SERPL-MCNC: 117 MG/DL (ref 65–99)
HCT VFR BLD AUTO: 41 % (ref 34–46.6)
HCV AB SER QL: NORMAL
HDLC SERPL-MCNC: 37 MG/DL (ref 40–60)
HGB BLD-MCNC: 13.4 G/DL (ref 12–15.9)
IMM GRANULOCYTES # BLD AUTO: 0.05 10*3/MM3 (ref 0–0.05)
IMM GRANULOCYTES NFR BLD AUTO: 0.4 % (ref 0–0.5)
LDLC SERPL CALC-MCNC: 90 MG/DL (ref 0–100)
LDLC/HDLC SERPL: 2.15 {RATIO}
LYMPHOCYTES # BLD AUTO: 2.35 10*3/MM3 (ref 0.7–3.1)
LYMPHOCYTES NFR BLD AUTO: 21.1 % (ref 19.6–45.3)
MCH RBC QN AUTO: 29.3 PG (ref 26.6–33)
MCHC RBC AUTO-ENTMCNC: 32.7 G/DL (ref 31.5–35.7)
MCV RBC AUTO: 89.7 FL (ref 79–97)
MONOCYTES # BLD AUTO: 0.74 10*3/MM3 (ref 0.1–0.9)
MONOCYTES NFR BLD AUTO: 6.7 % (ref 5–12)
NEUTROPHILS NFR BLD AUTO: 69 % (ref 42.7–76)
NEUTROPHILS NFR BLD AUTO: 7.67 10*3/MM3 (ref 1.7–7)
NRBC BLD AUTO-RTO: 0 /100 WBC (ref 0–0.2)
PLATELET # BLD AUTO: 319 10*3/MM3 (ref 140–450)
PMV BLD AUTO: 11.5 FL (ref 6–12)
POTASSIUM SERPL-SCNC: 4.3 MMOL/L (ref 3.5–5.2)
PROT SERPL-MCNC: 7.1 G/DL (ref 6–8.5)
RBC # BLD AUTO: 4.57 10*6/MM3 (ref 3.77–5.28)
SODIUM SERPL-SCNC: 140 MMOL/L (ref 136–145)
T4 FREE SERPL-MCNC: 1.02 NG/DL (ref 0.92–1.68)
TRIGL SERPL-MCNC: 308 MG/DL (ref 0–150)
TSH SERPL DL<=0.05 MIU/L-ACNC: 7.51 UIU/ML (ref 0.27–4.2)
VLDLC SERPL-MCNC: 51 MG/DL (ref 5–40)
WBC NRBC COR # BLD AUTO: 11.12 10*3/MM3 (ref 3.4–10.8)

## 2024-07-31 PROCEDURE — 80053 COMPREHEN METABOLIC PANEL: CPT | Performed by: INTERNAL MEDICINE

## 2024-07-31 PROCEDURE — 80061 LIPID PANEL: CPT | Performed by: INTERNAL MEDICINE

## 2024-07-31 PROCEDURE — 86803 HEPATITIS C AB TEST: CPT | Performed by: INTERNAL MEDICINE

## 2024-07-31 PROCEDURE — 85025 COMPLETE CBC W/AUTO DIFF WBC: CPT | Performed by: INTERNAL MEDICINE

## 2024-07-31 PROCEDURE — 82306 VITAMIN D 25 HYDROXY: CPT | Performed by: INTERNAL MEDICINE

## 2024-07-31 PROCEDURE — 84443 ASSAY THYROID STIM HORMONE: CPT | Performed by: INTERNAL MEDICINE

## 2024-07-31 PROCEDURE — 84439 ASSAY OF FREE THYROXINE: CPT | Performed by: INTERNAL MEDICINE

## 2024-07-31 PROCEDURE — 36415 COLL VENOUS BLD VENIPUNCTURE: CPT | Performed by: INTERNAL MEDICINE

## 2024-07-31 NOTE — PROGRESS NOTES
Venipuncture Blood Specimen Collection  Venipuncture performed in [facility] by Constance Mcneil RN with good hemostasis. Patient tolerated the procedure well without complications.   07/31/24   Constance Mcneil RN

## 2024-08-01 RX ORDER — LEVOTHYROXINE SODIUM 0.12 MG/1
125 TABLET ORAL DAILY
Qty: 90 TABLET | Refills: 1 | Status: SHIPPED | OUTPATIENT
Start: 2024-08-01

## 2024-08-02 ENCOUNTER — TELEPHONE (OUTPATIENT)
Dept: INTERNAL MEDICINE | Facility: CLINIC | Age: 64
End: 2024-08-02
Payer: COMMERCIAL

## 2024-08-02 RX ORDER — ERGOCALCIFEROL 1.25 MG/1
50000 CAPSULE ORAL WEEKLY
Qty: 12 CAPSULE | Refills: 0 | Status: SHIPPED | OUTPATIENT
Start: 2024-08-02

## 2024-08-02 NOTE — TELEPHONE ENCOUNTER
Pt stated she talked about starting a shot for her weight but hadn't seen anything called in yet. She wanted to know if something could be called to her pharmacy that she could  tomorrow.    There are no Wet Read(s) to document.

## 2024-08-02 NOTE — TELEPHONE ENCOUNTER
Patient called office stating at her last appt provider discuss with patient a diabetic medication stating after getting her labs it was going to be sent,  please advise

## 2024-08-05 ENCOUNTER — TELEPHONE (OUTPATIENT)
Dept: INTERNAL MEDICINE | Facility: CLINIC | Age: 64
End: 2024-08-05
Payer: COMMERCIAL

## 2024-08-05 RX ORDER — SEMAGLUTIDE 1.34 MG/ML
0.25 INJECTION, SOLUTION SUBCUTANEOUS WEEKLY
Qty: 2 ML | Refills: 1 | Status: SHIPPED | OUTPATIENT
Start: 2024-08-05

## 2024-08-05 NOTE — TELEPHONE ENCOUNTER
Received on call page stating that needed assistance with PA for diabetic medicine.  Spoke with Miss Brito over the phone.  Explained that the on call was unable to process a PA, but that I would send message to Dr. rTistan on her behalf.

## 2024-08-05 NOTE — TELEPHONE ENCOUNTER
Ozempic sent to her pharmacy please let her know.  Please inform her to do 1 shot a week monitor for nausea at the end of 4 weeks if she is doing well we could consider increasing her dose if she wishes.

## 2024-08-06 ENCOUNTER — PRIOR AUTHORIZATION (OUTPATIENT)
Dept: INTERNAL MEDICINE | Facility: CLINIC | Age: 64
End: 2024-08-06
Payer: COMMERCIAL

## 2024-08-06 NOTE — TELEPHONE ENCOUNTER
She did have an A1c of 6.5 please ensure we submit the appropriate documentation.  Please call patient and let her know what is going on with this.

## 2024-08-06 NOTE — TELEPHONE ENCOUNTER
Ozempic (0.25 or 0.5 MG/DOSE) 2MG/3ML pen-injectors    This request has not been approved. Based on the information sent for review, the requested drug did not meet our guideline rules. To get the request approved, your doctor needs to show that you have met the guideline rules below. If you have questions, please call your doctor. In some cases, the requested drug or alternatives offered may have other guideline rules that need to be met. Our guideline named GLP-1 RECEPTOR AGONISTS - PREFERRED requires the following rule(s) be met for approval:A. The member has a diagnosis of type 2 diabetes [a disorder with high blood sugar] supported by:1. The submission of chart or progress notes confirming the ICD-10 [diagnosis] within the last 12 months OR2. The submission of an HbA1c [a type of diagnostic test] lab value greater than or equal to 6.5 percent within the last 12 months.Your doctor told us you have a diagnosis of type 2 diabetes [a disorder with high blood sugar]. We do not have chart or progress notes confirming the ICD-10 [diagnosis] within the last 12 months OR documentation of an HbA1c [a type of diagnostic test] lab value greater than or equal to 6.5 percent within the last 12 months. This is why your request is denied. Please work with your doctor to use a different medication or get us more information if it will allow us to approve this request. A written notification letter will follow with additional details.   Number Of Freeze-Thaw Cycles: 1 freeze-thaw cycle Consent: The patient's consent was obtained including but not limited to risks of crusting, scabbing, blistering, scarring, darker or lighter pigmentary change, recurrence, incomplete removal and infection. Render Note In Bullet Format When Appropriate: No Post-Care Instructions: I reviewed with the patient in detail post-care instructions. Patient is to wear sunprotection, and avoid picking at any of the treated lesions. Pt may apply Vaseline to crusted or scabbing areas. Duration Of Freeze Thaw-Cycle (Seconds): 5 Detail Level: Detailed

## 2024-08-08 NOTE — TELEPHONE ENCOUNTER
Ozempic (0.25 or 0.5 MG/DOSE) 2MG/3ML pen-injectors Appeal    This request has been approved. The authorization is effective from [id number] to [id number], as long as the member is enrolled in their current health plan.

## 2024-09-23 RX ORDER — LISINOPRIL 10 MG/1
10 TABLET ORAL DAILY
Qty: 90 TABLET | Refills: 1 | Status: SHIPPED | OUTPATIENT
Start: 2024-09-23

## 2024-09-27 RX ORDER — SEMAGLUTIDE 1.34 MG/ML
0.25 INJECTION, SOLUTION SUBCUTANEOUS WEEKLY
Qty: 2 ML | Refills: 1 | Status: SHIPPED | OUTPATIENT
Start: 2024-09-27 | End: 2024-09-27 | Stop reason: SDUPTHER

## 2024-09-27 RX ORDER — SEMAGLUTIDE 1.34 MG/ML
0.5 INJECTION, SOLUTION SUBCUTANEOUS WEEKLY
Qty: 2 ML | Refills: 1 | Status: SHIPPED | OUTPATIENT
Start: 2024-09-27

## 2024-09-30 RX ORDER — IBUPROFEN 600 MG/1
600 TABLET, FILM COATED ORAL EVERY 8 HOURS PRN
Qty: 90 TABLET | Refills: 1 | Status: SHIPPED | OUTPATIENT
Start: 2024-09-30

## 2024-09-30 RX ORDER — GLYCOPYRROLATE AND FORMOTEROL FUMARATE 9; 4.8 UG/1; UG/1
2 AEROSOL, METERED RESPIRATORY (INHALATION) 2 TIMES DAILY
Qty: 10.7 G | Refills: 2 | OUTPATIENT
Start: 2024-09-30

## 2024-09-30 RX ORDER — SENNOSIDES 8.6 MG
650 CAPSULE ORAL EVERY 8 HOURS PRN
Qty: 90 TABLET | Refills: 1 | Status: SHIPPED | OUTPATIENT
Start: 2024-09-30

## 2024-10-02 ENCOUNTER — TELEPHONE (OUTPATIENT)
Dept: INTERNAL MEDICINE | Facility: CLINIC | Age: 64
End: 2024-10-02
Payer: COMMERCIAL

## 2024-10-02 NOTE — TELEPHONE ENCOUNTER
[facility] [address] [phone] [facility] [phone] FX       What medication are you calling in regards to:     Semaglutide,0.25 or 0.5MG/DOS, (Ozempic, 0.25 or 0.5 MG/DOSE,) 2 MG/1.5ML solution pen-injector  0.5 mg, Weekly       What question does the pharmacy have: RECEIVED TWO DIFFERENT DOSAGES     Who is the provider that prescribed the medication: MD RAY

## 2024-10-18 ENCOUNTER — OFFICE VISIT (OUTPATIENT)
Dept: INTERNAL MEDICINE | Facility: CLINIC | Age: 64
End: 2024-10-18
Payer: COMMERCIAL

## 2024-10-18 VITALS
HEART RATE: 96 BPM | DIASTOLIC BLOOD PRESSURE: 62 MMHG | WEIGHT: 222.4 LBS | BODY MASS INDEX: 41.99 KG/M2 | RESPIRATION RATE: 22 BRPM | TEMPERATURE: 97.6 F | SYSTOLIC BLOOD PRESSURE: 158 MMHG | OXYGEN SATURATION: 94 % | HEIGHT: 61 IN

## 2024-10-18 DIAGNOSIS — J42 CHRONIC BRONCHITIS, UNSPECIFIED CHRONIC BRONCHITIS TYPE: ICD-10-CM

## 2024-10-18 DIAGNOSIS — F41.9 ANXIETY: ICD-10-CM

## 2024-10-18 DIAGNOSIS — E11.9 CONTROLLED TYPE 2 DIABETES MELLITUS WITHOUT COMPLICATION, WITHOUT LONG-TERM CURRENT USE OF INSULIN: Primary | ICD-10-CM

## 2024-10-18 DIAGNOSIS — E55.9 VITAMIN D DEFICIENCY: ICD-10-CM

## 2024-10-18 DIAGNOSIS — E03.9 HYPOTHYROIDISM, UNSPECIFIED TYPE: ICD-10-CM

## 2024-10-18 LAB
25(OH)D3 SERPL-MCNC: 41.3 NG/ML (ref 30–100)
ALBUMIN SERPL-MCNC: 3.7 G/DL (ref 3.5–5.2)
ALBUMIN/GLOB SERPL: 1.2 G/DL
ALP SERPL-CCNC: 130 U/L (ref 39–117)
ALT SERPL W P-5'-P-CCNC: 10 U/L (ref 1–33)
ANION GAP SERPL CALCULATED.3IONS-SCNC: 10.6 MMOL/L (ref 5–15)
AST SERPL-CCNC: 12 U/L (ref 1–32)
BASOPHILS # BLD AUTO: 0.06 10*3/MM3 (ref 0–0.2)
BASOPHILS NFR BLD AUTO: 0.6 % (ref 0–1.5)
BILIRUB SERPL-MCNC: 0.3 MG/DL (ref 0–1.2)
BUN SERPL-MCNC: 13 MG/DL (ref 8–23)
BUN/CREAT SERPL: 12.5 (ref 7–25)
CALCIUM SPEC-SCNC: 9.3 MG/DL (ref 8.6–10.5)
CHLORIDE SERPL-SCNC: 104 MMOL/L (ref 98–107)
CHOLEST SERPL-MCNC: 148 MG/DL (ref 0–200)
CO2 SERPL-SCNC: 27.4 MMOL/L (ref 22–29)
CREAT SERPL-MCNC: 1.04 MG/DL (ref 0.57–1)
DEPRECATED RDW RBC AUTO: 40 FL (ref 37–54)
EGFRCR SERPLBLD CKD-EPI 2021: 60.9 ML/MIN/1.73
EOSINOPHIL # BLD AUTO: 0.19 10*3/MM3 (ref 0–0.4)
EOSINOPHIL NFR BLD AUTO: 1.9 % (ref 0.3–6.2)
ERYTHROCYTE [DISTWIDTH] IN BLOOD BY AUTOMATED COUNT: 12 % (ref 12.3–15.4)
GLOBULIN UR ELPH-MCNC: 3 GM/DL
GLUCOSE SERPL-MCNC: 81 MG/DL (ref 65–99)
HBA1C MFR BLD: 6.3 % (ref 4.8–5.6)
HCT VFR BLD AUTO: 42.6 % (ref 34–46.6)
HDLC SERPL-MCNC: 34 MG/DL (ref 40–60)
HGB BLD-MCNC: 13.4 G/DL (ref 12–15.9)
IMM GRANULOCYTES # BLD AUTO: 0.04 10*3/MM3 (ref 0–0.05)
IMM GRANULOCYTES NFR BLD AUTO: 0.4 % (ref 0–0.5)
LDLC SERPL CALC-MCNC: 79 MG/DL (ref 0–100)
LDLC/HDLC SERPL: 2.14 {RATIO}
LYMPHOCYTES # BLD AUTO: 2 10*3/MM3 (ref 0.7–3.1)
LYMPHOCYTES NFR BLD AUTO: 19.5 % (ref 19.6–45.3)
MCH RBC QN AUTO: 28.9 PG (ref 26.6–33)
MCHC RBC AUTO-ENTMCNC: 31.5 G/DL (ref 31.5–35.7)
MCV RBC AUTO: 91.8 FL (ref 79–97)
MONOCYTES # BLD AUTO: 0.71 10*3/MM3 (ref 0.1–0.9)
MONOCYTES NFR BLD AUTO: 6.9 % (ref 5–12)
NEUTROPHILS NFR BLD AUTO: 7.27 10*3/MM3 (ref 1.7–7)
NEUTROPHILS NFR BLD AUTO: 70.7 % (ref 42.7–76)
NRBC BLD AUTO-RTO: 0 /100 WBC (ref 0–0.2)
PLATELET # BLD AUTO: 293 10*3/MM3 (ref 140–450)
PMV BLD AUTO: 12.3 FL (ref 6–12)
POTASSIUM SERPL-SCNC: 3.2 MMOL/L (ref 3.5–5.2)
PROT SERPL-MCNC: 6.7 G/DL (ref 6–8.5)
RBC # BLD AUTO: 4.64 10*6/MM3 (ref 3.77–5.28)
SODIUM SERPL-SCNC: 142 MMOL/L (ref 136–145)
T4 FREE SERPL-MCNC: 1.63 NG/DL (ref 0.92–1.68)
TRIGL SERPL-MCNC: 207 MG/DL (ref 0–150)
TSH SERPL DL<=0.05 MIU/L-ACNC: 0.88 UIU/ML (ref 0.27–4.2)
VLDLC SERPL-MCNC: 35 MG/DL (ref 5–40)
WBC NRBC COR # BLD AUTO: 10.27 10*3/MM3 (ref 3.4–10.8)

## 2024-10-18 PROCEDURE — 85025 COMPLETE CBC W/AUTO DIFF WBC: CPT | Performed by: INTERNAL MEDICINE

## 2024-10-18 PROCEDURE — 80053 COMPREHEN METABOLIC PANEL: CPT | Performed by: INTERNAL MEDICINE

## 2024-10-18 PROCEDURE — 1125F AMNT PAIN NOTED PAIN PRSNT: CPT | Performed by: INTERNAL MEDICINE

## 2024-10-18 PROCEDURE — 84439 ASSAY OF FREE THYROXINE: CPT | Performed by: INTERNAL MEDICINE

## 2024-10-18 PROCEDURE — 80061 LIPID PANEL: CPT | Performed by: INTERNAL MEDICINE

## 2024-10-18 PROCEDURE — 83036 HEMOGLOBIN GLYCOSYLATED A1C: CPT | Performed by: INTERNAL MEDICINE

## 2024-10-18 PROCEDURE — 82306 VITAMIN D 25 HYDROXY: CPT | Performed by: INTERNAL MEDICINE

## 2024-10-18 PROCEDURE — 84443 ASSAY THYROID STIM HORMONE: CPT | Performed by: INTERNAL MEDICINE

## 2024-10-18 PROCEDURE — 99214 OFFICE O/P EST MOD 30 MIN: CPT | Performed by: INTERNAL MEDICINE

## 2024-10-18 PROCEDURE — 3044F HG A1C LEVEL LT 7.0%: CPT | Performed by: INTERNAL MEDICINE

## 2024-10-18 NOTE — PROGRESS NOTES
"Chief Complaint  Anxiety, Hypothyroidism (3 mo f/u ), and Vitamin D Deficiency      Subjective      History of Present Illness  The patient presents for evaluation of weight management.    She reports an improvement in her overall health. However, she has noticed a slight weight gain over the past two weeks, with an increase of one pound per week. Her current weight is 222 pounds, up from a previous low of 220 pounds. She is considering increasing her medication dosage to counteract this. She believes her medications are effective and has noticed an increase in her activity level. She has been able to walk through the [email or web address] market and grocery store without assistance, although she uses a buggy for support in stores.    She occasionally experiences nausea but is not experiencing any chest pain or leg swelling. Her stress and anxiety levels are well-managed. She maintains a balanced diet, eating once a day and supplementing with light meals in the evening. She has reduced her meat intake and increased her consumption of fruits and vegetables. She admits to eating out of boredom and is making efforts to avoid junk food and soda.    She declines the influenza vaccine. She plans to have her eyes checked at [facility] and is currently focused on managing her weight.    Supplemental Information:  She has cataracts in one of her eyes.         Objective   Vital Signs:   Vitals:    10/18/24 0908   BP: 158/62   BP Location: Right arm   Patient Position: Sitting   Cuff Size: Adult   Pulse: 96   Resp: 22   Temp: 97.6 °F (36.4 °C)   TempSrc: Temporal   SpO2: 94%   Weight: 101 kg (222 lb 6.4 oz)   Height: 154.9 cm (60.98\")     Body mass index is 42.04 kg/m².    Wt Readings from Last 3 Encounters:   10/18/24 101 kg (222 lb 6.4 oz)   04/15/24 111 kg (245 lb 6.4 oz)   02/23/24 111 kg (244 lb 8 oz)     BP Readings from Last 3 Encounters:   10/18/24 158/62   04/15/24 158/90   02/23/24 135/62       Health Maintenance   Topic Date Due    " URINE MICROALBUMIN  Never done    COLORECTAL CANCER SCREENING  Never done    ANNUAL PHYSICAL  Never done    DIABETIC FOOT EXAM  Never done    HEMOGLOBIN A1C  08/05/2024    LUNG CANCER SCREENING  01/05/2025    COVID-19 Vaccine (1 - 2023-24 season) 10/20/2024 (Originally 9/1/2024)    DIABETIC EYE EXAM  10/25/2024 (Originally 7/29/1970)    ZOSTER VACCINE (1 of 2) 12/13/2024 (Originally 7/29/2012)    PAP SMEAR  02/04/2025 (Originally 6/11/2021)    TDAP/TD VACCINES (1 - Tdap) 02/05/2025 (Originally 7/29/1981)    INFLUENZA VACCINE  03/31/2025 (Originally 8/1/2024)    BMI FOLLOWUP  09/27/2025    MAMMOGRAM  07/08/2026    HEPATITIS C SCREENING  Completed    Pneumococcal Vaccine 0-64  Completed       Physical Exam  Vitals reviewed.   Constitutional:       Appearance: Normal appearance. She is well-developed.   HENT:      Head: Normocephalic and atraumatic.      Right Ear: External ear normal.      Left Ear: External ear normal.   Eyes:      Conjunctiva/sclera: Conjunctivae normal.      Pupils: Pupils are equal, round, and reactive to light.   Cardiovascular:      Rate and Rhythm: Normal rate and regular rhythm.      Heart sounds: No murmur heard.     No friction rub. No gallop.   Pulmonary:      Effort: Pulmonary effort is normal.      Breath sounds: Normal breath sounds. No wheezing or rhonchi.   Skin:     General: Skin is warm and dry.   Neurological:      Mental Status: She is alert and oriented to person, place, and time.   Psychiatric:         Mood and Affect: Affect normal.         Behavior: Behavior normal.         Thought Content: Thought content normal.        Physical Exam        Result Review :  The following data was reviewed by: Laverne Tristan MD on 10/18/2024:         Results              Procedures            Assessment & Plan  Chronic bronchitis, unspecified chronic bronchitis type    Anxiety    Controlled type 2 diabetes mellitus without complication, without long-term current use of insulin    Vitamin D  deficiency    Hypothyroidism, unspecified type      Orders Placed This Encounter   Procedures    Comprehensive Metabolic Panel    Lipid Panel    TSH    MicroAlbumin, Urine, Random - Urine, Clean Catch    Hemoglobin A1c    Vitamin D,25-Hydroxy    T4, Free    CBC & Differential             Assessment & Plan  1. DM II  She has achieved a significant weight loss of 23 pounds, which is commendable. She has been on Ozempic 0.5 mg for a week and reports occasional nausea as a side effect. She plans to increase the dose to improve weight loss. Her breathing has improved, and she is utilizing the Combivent inhaler as needed, though infrequently. She reports better mobility and increased physical activity, such as walking through the [employer] market and grocery shopping. She is advised to continue her current regimen and monitor for any side effects.    2. COPD  Her breathing has improved, and she uses the Combivent inhaler as needed, though infrequently. She reports no chest pain or leg swelling. Her lung sounds are significantly better on examination. She is advised to continue monitoring her symptoms and use the inhaler as needed.    3. Health Maintenance.  She is due for a colon cancer screening and has a Cologuard test kit at home. She is advised to complete the test this week for early detection and reassurance. Blood work will be conducted today to assess her A1c, thyroid function, and vitamin D levels. The influenza vaccine was offered but declined. She is also advised to get an eye exam and consider new glasses due to cataracts in one eye.      Patient or patient representative verbalized consent for the use of Ambient Listening during the visit with  Laverne Tristan MD for chart documentation. 10/18/2024  09:35 EDT      FOLLOW UP  Return in about 4 months (around 2/18/2025).  Patient was given instructions and counseling regarding her condition or for health maintenance advice. Please see specific information pulled  into the AVS if appropriate.     Laverne Tristan MD  10/18/24  09:42 EDT    CURRENT & DISCONTINUED MEDICATIONS  Current Outpatient Medications   Medication Instructions    acetaminophen (TYLENOL) 650 mg, Oral, Every 8 Hours PRN    atorvastatin (LIPITOR) 20 mg, Oral, Daily    buPROPion XL (WELLBUTRIN XL) 300 mg, Oral, Daily    ibuprofen (ADVIL,MOTRIN) 600 mg, Oral, Every 8 Hours PRN    ipratropium-albuterol (Combivent Respimat)  MCG/ACT inhaler 2 puffs, Inhalation, 4 Times Daily - RT    levothyroxine (SYNTHROID) 125 mcg, Oral, Daily    lisinopril (PRINIVIL,ZESTRIL) 10 mg, Oral, Daily    Ozempic (0.25 or 0.5 MG/DOSE) 0.5 mg, Subcutaneous, Weekly    vitamin D (ERGOCALCIFEROL) 50,000 Units, Oral, Weekly       There are no discontinued medications.

## 2024-11-21 ENCOUNTER — TELEPHONE (OUTPATIENT)
Dept: INTERNAL MEDICINE | Facility: CLINIC | Age: 64
End: 2024-11-21
Payer: COMMERCIAL

## 2024-11-21 NOTE — TELEPHONE ENCOUNTER
Caller: Denise Brito    Relationship: Self    Best call back number: [phone]    What medication are you requesting: INCREASE OZEMPIC DOSAGE OR SWITCH TO MOUNJARO    What are your current symptoms: N/A    How long have you been experiencing symptoms: N/A    Have you had these symptoms before:    [] Yes  [] No    Have you been treated for these symptoms before:   [] Yes  [] No    If a prescription is needed, what is your preferred pharmacy and phone number: [address] [phone] [facility] [phone]      Additional notes:PATIENT HAS BEEN AT A STAND STILL AT HER CURRENT DOSAGE OF THE OZEMPIC. SHE IS NOT LOSING WEIGHT AND IT HAS BEEN OVER THREE WEEKS NOW.     PLEASE EITHER INCREASE THE DOSAGE OF THE OZEMPIC OR SWITCH HER TO SIMILAR DOSAGE OF THE MOUNJARO. WHICHEVER YOU FEEL IS BEST.

## 2024-11-22 NOTE — TELEPHONE ENCOUNTER
Remind me has she been on the Mounjaro and liked it better?  If not is fine to just stay with the Ozempic please clarify if she has been doing the 0.25 or the 0.5.  Please also clarify if she is having any nausea or other issues.

## 2024-11-25 NOTE — TELEPHONE ENCOUNTER
Okay please let her know I sent the Mounjaro.  If she is doing well after 4 weeks we can increase the dose.

## 2024-11-25 NOTE — TELEPHONE ENCOUNTER
Called and spoke with pt, explained to pt medication has been sent to the pharmacy, pt verbalized understanding and had no further questions at this time.

## 2024-11-25 NOTE — TELEPHONE ENCOUNTER
Spoke with patient and she stated she has never been on mounjaro but a friend of hers is on it and she is doing really good on medication, she also stated she has not lost any weight on ozempic, she stated she is on 0.5mg and she has not had any nausea or other issues, please advise

## 2024-11-26 RX ORDER — SENNOSIDES 8.6 MG
650 CAPSULE ORAL EVERY 8 HOURS PRN
Qty: 90 TABLET | Refills: 1 | Status: SHIPPED | OUTPATIENT
Start: 2024-11-26

## 2024-11-26 RX ORDER — IBUPROFEN 600 MG/1
600 TABLET, FILM COATED ORAL EVERY 8 HOURS PRN
Qty: 90 TABLET | Refills: 1 | Status: SHIPPED | OUTPATIENT
Start: 2024-11-26

## 2024-12-02 ENCOUNTER — TELEPHONE (OUTPATIENT)
Dept: INTERNAL MEDICINE | Facility: CLINIC | Age: 64
End: 2024-12-02
Payer: COMMERCIAL

## 2024-12-02 ENCOUNTER — PRIOR AUTHORIZATION (OUTPATIENT)
Dept: INTERNAL MEDICINE | Facility: CLINIC | Age: 64
End: 2024-12-02
Payer: COMMERCIAL

## 2024-12-02 RX ORDER — SEMAGLUTIDE 1 MG/.5ML
1 INJECTION, SOLUTION SUBCUTANEOUS WEEKLY
Qty: 6 ML | Refills: 1 | Status: SHIPPED | OUTPATIENT
Start: 2024-12-02 | End: 2024-12-04

## 2024-12-02 NOTE — TELEPHONE ENCOUNTER
Wegovy 1MG/0.5ML auto-injectors    [employer] is a pharmaceutical benefit management company that provides services to   members of [facility], [place]. Certain prescription   drugs and services require a prior authorization before they can be covered by your benefit   plan. When this occurs, your healthcare practitioner will contact [employer] to request a prior   authorization.  Prior authorization requests are completed by licensed pharmacists. Based upon the   information we received from your healthcare practitioner, we are unable to authorize   WEGOVY 1 MG/0.5 ML PEN at this time. The reason(s) for this determination is (are):  Based on the information we received, you did not meet the specific rule(s) needed to   approve this request. In some cases, the requested drug or alternatives offered may have   other guideline rules that need to be met.

## 2024-12-02 NOTE — TELEPHONE ENCOUNTER
Please call and let her know that I sent over Wegovy.  It is the same thing is Ozempic just branded differently for weight loss rather than diabetes.

## 2024-12-02 NOTE — TELEPHONE ENCOUNTER
Patient called into office stating that her insurance would not pay for Mounjaro (or the generic) or Ozempic.  She confirmed that they would cover Trulicity; Wegovy and Victoza and that she needed to have Dr. Tristan order one of those for her.  Explained that her insurance company many require a prior authorization for those medications and that although they may be covered, that is still part of the process for them to pay for medication.  Patient stated that she just wanted to have something sent over that was covered and didn't want to go through fighting it for 90 days trying to get it covered.  Told her I would pass information along to provider.

## 2024-12-03 ENCOUNTER — PATIENT MESSAGE (OUTPATIENT)
Dept: INTERNAL MEDICINE | Facility: CLINIC | Age: 64
End: 2024-12-03
Payer: COMMERCIAL

## 2024-12-03 NOTE — TELEPHONE ENCOUNTER
She had requested because she thought insurance was going to cover this please let her know that it appears they are not.

## 2024-12-04 RX ORDER — SEMAGLUTIDE 1.34 MG/ML
1 INJECTION, SOLUTION SUBCUTANEOUS WEEKLY
Qty: 3 ML | Refills: 1 | Status: SHIPPED | OUTPATIENT
Start: 2024-12-04

## 2025-01-15 RX ORDER — ATORVASTATIN CALCIUM 20 MG/1
20 TABLET, FILM COATED ORAL DAILY
Qty: 90 TABLET | Refills: 1 | Status: SHIPPED | OUTPATIENT
Start: 2025-01-15

## 2025-01-15 RX ORDER — BUPROPION HYDROCHLORIDE 300 MG/1
300 TABLET ORAL DAILY
Qty: 90 TABLET | Refills: 1 | Status: SHIPPED | OUTPATIENT
Start: 2025-01-15

## 2025-01-16 RX ORDER — SEMAGLUTIDE 1.34 MG/ML
INJECTION, SOLUTION SUBCUTANEOUS
Qty: 3 ML | Refills: 1 | Status: SHIPPED | OUTPATIENT
Start: 2025-01-16

## 2025-01-22 RX ORDER — SENNOSIDES 8.6 MG
650 CAPSULE ORAL EVERY 8 HOURS PRN
Qty: 90 TABLET | Refills: 1 | Status: SHIPPED | OUTPATIENT
Start: 2025-01-22

## 2025-01-22 RX ORDER — IBUPROFEN 600 MG/1
600 TABLET, FILM COATED ORAL EVERY 8 HOURS PRN
Qty: 90 TABLET | Refills: 1 | Status: SHIPPED | OUTPATIENT
Start: 2025-01-22

## 2025-01-27 RX ORDER — LEVOTHYROXINE SODIUM 125 UG/1
125 TABLET ORAL DAILY
Qty: 90 TABLET | Refills: 1 | Status: SHIPPED | OUTPATIENT
Start: 2025-01-27

## 2025-02-17 ENCOUNTER — TELEPHONE (OUTPATIENT)
Dept: INTERNAL MEDICINE | Facility: CLINIC | Age: 65
End: 2025-02-17
Payer: COMMERCIAL

## 2025-02-17 RX ORDER — SEMAGLUTIDE 1.34 MG/ML
1 INJECTION, SOLUTION SUBCUTANEOUS WEEKLY
Qty: 3 ML | Refills: 1 | Status: SHIPPED | OUTPATIENT
Start: 2025-02-17

## 2025-02-17 RX ORDER — LISINOPRIL 10 MG/1
10 TABLET ORAL DAILY
Qty: 90 TABLET | Refills: 1 | Status: SHIPPED | OUTPATIENT
Start: 2025-02-17

## 2025-02-17 NOTE — TELEPHONE ENCOUNTER
Caller: Denise Brito    Relationship: Self    Best call back number: [phone]    What is the best time to reach you: ANY    Who are you requesting to speak with (clinical staff, provider,  specific staff member): CLINICAL    Do you know the name of the person who called: PATIENT    What was the call regarding: PATIENT RESCHEDULED HER FOLLOW UP APPT DUE TO THE WEATHER. PLEASE SEND NEW PRESCRIPTIONS FOR HER MEDICATIONS SHE WILL RUN OUT OF REFILLS ON BEFORE 05/07/2025.     PLEASE CALL PATIENT WHEN THESE ARE SENT TO PHARMACY SO SHE CAN REFILL UNTIL SHE CAN BE SEEN.    Is it okay if the provider responds through [facility]: PREFERS A CALL HAS ISSUES WITH [employer]

## 2025-02-17 NOTE — TELEPHONE ENCOUNTER
Called and spoke with pt, pt stated she needed a refill on her ozempic and her lisinopril, refilled both medications and sent them to the pharmacy. Explained to pt medications have been sent to the pharmacy, pt verbalized understanding and had no further questions at this time.

## 2025-03-07 ENCOUNTER — TELEPHONE (OUTPATIENT)
Dept: INTERNAL MEDICINE | Facility: CLINIC | Age: 65
End: 2025-03-07
Payer: COMMERCIAL

## 2025-03-07 NOTE — TELEPHONE ENCOUNTER
Spoke with patient and inform her medication were filled last month for 90 days and 1 refill, pt verbalized understanding.

## 2025-03-07 NOTE — TELEPHONE ENCOUNTER
Caller: Denise Brito     Relationship:  Self     Best call back number:  [phone]     Requested Prescriptions:   Semaglutide, 1 MG/DOSE, (Ozempic, 1 MG/DOSE,) 4 MG/3ML solution pen-injector     lisinopril (PRINIVIL,ZESTRIL) 10 MG tablet     levothyroxine (SYNTHROID, LEVOTHROID) 125 MCG tablet     atorvastatin (LIPITOR) 20 MG tablet     buPROPion XL (WELLBUTRIN XL) 300 MG 24 hr tablet        Pharmacy where request should be sent:   [facility]    Additional details provided by patient: All medications except for motrin and tylenol     Does the patient have less than a 3 day supply:  [x] Yes  [] No     Would you like a call back once the refill request has been completed: [x] Yes [] No     If the office needs to give you a call back, can they leave a voicemail: [x] Yes [] No

## 2025-05-07 ENCOUNTER — OFFICE VISIT (OUTPATIENT)
Dept: INTERNAL MEDICINE | Facility: CLINIC | Age: 65
End: 2025-05-07
Payer: COMMERCIAL

## 2025-05-07 VITALS
RESPIRATION RATE: 22 BRPM | WEIGHT: 187.6 LBS | BODY MASS INDEX: 35.42 KG/M2 | SYSTOLIC BLOOD PRESSURE: 164 MMHG | HEIGHT: 61 IN | TEMPERATURE: 98.1 F | HEART RATE: 72 BPM | OXYGEN SATURATION: 96 % | DIASTOLIC BLOOD PRESSURE: 74 MMHG

## 2025-05-07 DIAGNOSIS — Z87.891 PERSONAL HISTORY OF TOBACCO USE: ICD-10-CM

## 2025-05-07 DIAGNOSIS — I50.9 CONGESTIVE HEART FAILURE, UNSPECIFIED HF CHRONICITY, UNSPECIFIED HEART FAILURE TYPE: ICD-10-CM

## 2025-05-07 DIAGNOSIS — E03.9 HYPOTHYROIDISM, UNSPECIFIED TYPE: ICD-10-CM

## 2025-05-07 DIAGNOSIS — J42 CHRONIC BRONCHITIS, UNSPECIFIED CHRONIC BRONCHITIS TYPE: ICD-10-CM

## 2025-05-07 DIAGNOSIS — E55.9 VITAMIN D DEFICIENCY: ICD-10-CM

## 2025-05-07 DIAGNOSIS — I10 PRIMARY HYPERTENSION: ICD-10-CM

## 2025-05-07 DIAGNOSIS — E11.9 TYPE 2 DIABETES MELLITUS WITHOUT COMPLICATION, WITHOUT LONG-TERM CURRENT USE OF INSULIN: Primary | ICD-10-CM

## 2025-05-07 DIAGNOSIS — F41.9 ANXIETY: ICD-10-CM

## 2025-05-07 LAB
25(OH)D3 SERPL-MCNC: 25.6 NG/ML (ref 30–100)
ALBUMIN SERPL-MCNC: 3.8 G/DL (ref 3.5–5.2)
ALBUMIN/GLOB SERPL: 1.4 G/DL
ALP SERPL-CCNC: 148 U/L (ref 39–117)
ALT SERPL W P-5'-P-CCNC: 12 U/L (ref 1–33)
ANION GAP SERPL CALCULATED.3IONS-SCNC: 10.8 MMOL/L (ref 5–15)
AST SERPL-CCNC: 18 U/L (ref 1–32)
BASOPHILS # BLD AUTO: 0.06 10*3/MM3 (ref 0–0.2)
BASOPHILS NFR BLD AUTO: 0.6 % (ref 0–1.5)
BILIRUB SERPL-MCNC: 0.3 MG/DL (ref 0–1.2)
BUN SERPL-MCNC: 18 MG/DL (ref 8–23)
BUN/CREAT SERPL: 16.5 (ref 7–25)
CALCIUM SPEC-SCNC: 9.6 MG/DL (ref 8.6–10.5)
CHLORIDE SERPL-SCNC: 103 MMOL/L (ref 98–107)
CHOLEST SERPL-MCNC: 147 MG/DL (ref 0–200)
CO2 SERPL-SCNC: 28.2 MMOL/L (ref 22–29)
CREAT SERPL-MCNC: 1.09 MG/DL (ref 0.57–1)
DEPRECATED RDW RBC AUTO: 40.6 FL (ref 37–54)
EGFRCR SERPLBLD CKD-EPI 2021: 57.6 ML/MIN/1.73
EOSINOPHIL # BLD AUTO: 0.13 10*3/MM3 (ref 0–0.4)
EOSINOPHIL NFR BLD AUTO: 1.4 % (ref 0.3–6.2)
ERYTHROCYTE [DISTWIDTH] IN BLOOD BY AUTOMATED COUNT: 12.1 % (ref 12.3–15.4)
GLOBULIN UR ELPH-MCNC: 2.8 GM/DL
GLUCOSE SERPL-MCNC: 65 MG/DL (ref 65–99)
HBA1C MFR BLD: 6 % (ref 4.8–5.6)
HCT VFR BLD AUTO: 41.3 % (ref 34–46.6)
HDLC SERPL-MCNC: 39 MG/DL (ref 40–60)
HGB BLD-MCNC: 13.3 G/DL (ref 12–15.9)
IMM GRANULOCYTES # BLD AUTO: 0.03 10*3/MM3 (ref 0–0.05)
IMM GRANULOCYTES NFR BLD AUTO: 0.3 % (ref 0–0.5)
LDLC SERPL CALC-MCNC: 80 MG/DL (ref 0–100)
LDLC/HDLC SERPL: 1.93 {RATIO}
LYMPHOCYTES # BLD AUTO: 2.5 10*3/MM3 (ref 0.7–3.1)
LYMPHOCYTES NFR BLD AUTO: 26 % (ref 19.6–45.3)
MCH RBC QN AUTO: 29.5 PG (ref 26.6–33)
MCHC RBC AUTO-ENTMCNC: 32.2 G/DL (ref 31.5–35.7)
MCV RBC AUTO: 91.6 FL (ref 79–97)
MONOCYTES # BLD AUTO: 0.71 10*3/MM3 (ref 0.1–0.9)
MONOCYTES NFR BLD AUTO: 7.4 % (ref 5–12)
NEUTROPHILS NFR BLD AUTO: 6.19 10*3/MM3 (ref 1.7–7)
NEUTROPHILS NFR BLD AUTO: 64.3 % (ref 42.7–76)
NRBC BLD AUTO-RTO: 0 /100 WBC (ref 0–0.2)
PLATELET # BLD AUTO: 283 10*3/MM3 (ref 140–450)
PMV BLD AUTO: 12.5 FL (ref 6–12)
POTASSIUM SERPL-SCNC: 3.6 MMOL/L (ref 3.5–5.2)
PROT SERPL-MCNC: 6.6 G/DL (ref 6–8.5)
RBC # BLD AUTO: 4.51 10*6/MM3 (ref 3.77–5.28)
SODIUM SERPL-SCNC: 142 MMOL/L (ref 136–145)
T4 FREE SERPL-MCNC: 1.59 NG/DL (ref 0.92–1.68)
TRIGL SERPL-MCNC: 164 MG/DL (ref 0–150)
TSH SERPL DL<=0.05 MIU/L-ACNC: 0.13 UIU/ML (ref 0.27–4.2)
VLDLC SERPL-MCNC: 28 MG/DL (ref 5–40)
WBC NRBC COR # BLD AUTO: 9.62 10*3/MM3 (ref 3.4–10.8)

## 2025-05-07 PROCEDURE — 85025 COMPLETE CBC W/AUTO DIFF WBC: CPT | Performed by: INTERNAL MEDICINE

## 2025-05-07 PROCEDURE — 84443 ASSAY THYROID STIM HORMONE: CPT | Performed by: INTERNAL MEDICINE

## 2025-05-07 PROCEDURE — 80053 COMPREHEN METABOLIC PANEL: CPT | Performed by: INTERNAL MEDICINE

## 2025-05-07 PROCEDURE — 82306 VITAMIN D 25 HYDROXY: CPT | Performed by: INTERNAL MEDICINE

## 2025-05-07 PROCEDURE — 84439 ASSAY OF FREE THYROXINE: CPT | Performed by: INTERNAL MEDICINE

## 2025-05-07 PROCEDURE — 83036 HEMOGLOBIN GLYCOSYLATED A1C: CPT | Performed by: INTERNAL MEDICINE

## 2025-05-07 PROCEDURE — 80061 LIPID PANEL: CPT | Performed by: INTERNAL MEDICINE

## 2025-05-07 RX ORDER — ALCOHOL ANTISEPTIC PADS
PADS, MEDICATED (EA) TOPICAL
COMMUNITY
Start: 2025-03-03

## 2025-05-07 RX ORDER — LISINOPRIL 20 MG/1
20 TABLET ORAL DAILY
Qty: 90 TABLET | Refills: 1 | Status: SHIPPED | OUTPATIENT
Start: 2025-05-07

## 2025-05-07 RX ORDER — BLOOD SUGAR DIAGNOSTIC
STRIP MISCELLANEOUS SEE ADMIN INSTRUCTIONS
COMMUNITY
Start: 2025-03-03

## 2025-05-07 RX ORDER — SEMAGLUTIDE 2.68 MG/ML
2 INJECTION, SOLUTION SUBCUTANEOUS WEEKLY
Qty: 3 ML | Refills: 1 | Status: SHIPPED | OUTPATIENT
Start: 2025-05-07

## 2025-05-07 NOTE — PROGRESS NOTES
"Chief Complaint  Diabetes (4 mo f/u ), Hypothyroidism, Anxiety, Vitamin D Deficiency, and Weight Management (Would like to discuss increasing medication )      Subjective      History of Present Illness  The patient presents for weight management, hypertension, anxiety, and smoking cessation.    She reports a general improvement in health status, attributing this to a weight loss of 15-20 pounds. Her weight fluctuates between 197-198 pounds, and she is considering an increase in her medication dosage. No adverse effects from the medication. She acknowledges a change in eating habits over the past year, contributing to slow but steady weight loss. Diet includes rice cups, ice cream, fruit, cheese, chips, and hamburgers. High soda intake but transitioning to tea. No hunger pangs, downsized plate to control portions. Increased physical activity, including walking around stores and neighborhood. Currently on Ozempic.    She attributes elevated blood pressure to a large breakfast earlier in the day. Does not monitor blood pressure at home and does not own a cuff. Currently on lisinopril.    Continues to smoke less than a pack a day, a habit since age 17.     Reports medications, including Wellbutrin, are effective. Improved breathing and sleep quality. Currently on Wellbutrin.    Unsure about vitamin D status and supplementation.    SOCIAL HISTORY  - Smokes less than a pack a day for approximately 50 years  - Does not drink alcohol, abstinent for about 5 years    FAMILY HISTORY  - Family history of alcoholism in parents and grandparents         Objective   Vital Signs:   Vitals:    05/07/25 1554   BP: 164/74   BP Location: Left arm   Patient Position: Sitting   Cuff Size: Adult   Pulse: 72   Resp: 22   Temp: 98.1 °F (36.7 °C)   TempSrc: Temporal   SpO2: 96%   Weight: 85.1 kg (187 lb 9.6 oz)   Height: 154.9 cm (60.98\")     Body mass index is 35.47 kg/m².    Wt Readings from Last 3 Encounters:   05/07/25 85.1 kg (187 lb " 9.6 oz)   10/18/24 101 kg (222 lb 6.4 oz)   04/15/24 111 kg (245 lb 6.4 oz)     BP Readings from Last 3 Encounters:   05/07/25 164/74   10/18/24 158/62   04/15/24 158/90       Health Maintenance   Topic Date Due    DIABETIC FOOT EXAM  Never done    DIABETIC EYE EXAM  Never done    URINE MICROALBUMIN-CREATININE RATIO (uACR)  Never done    TDAP/TD VACCINES (1 - Tdap) Never done    PAP SMEAR  Never done    COLORECTAL CANCER SCREENING  Never done    ZOSTER VACCINE (1 of 2) Never done    ANNUAL PHYSICAL  Never done    COVID-19 Vaccine (1 - 2024-25 season) Never done    LUNG CANCER SCREENING  01/05/2025    HEMOGLOBIN A1C  04/18/2025    INFLUENZA VACCINE  07/01/2025    MAMMOGRAM  07/08/2026    HEPATITIS C SCREENING  Completed    Pneumococcal Vaccine 50+  Completed       Physical Exam  Vitals reviewed.   Constitutional:       Appearance: Normal appearance. She is well-developed.   HENT:      Head: Normocephalic and atraumatic.      Right Ear: External ear normal.      Left Ear: External ear normal.   Eyes:      Conjunctiva/sclera: Conjunctivae normal.      Pupils: Pupils are equal, round, and reactive to light.   Cardiovascular:      Rate and Rhythm: Normal rate and regular rhythm.      Heart sounds: No murmur heard.     No friction rub. No gallop.   Pulmonary:      Effort: Pulmonary effort is normal.      Breath sounds: Normal breath sounds. No wheezing or rhonchi.   Skin:     General: Skin is warm and dry.   Neurological:      Mental Status: She is alert and oriented to person, place, and time.   Psychiatric:         Mood and Affect: Affect normal.         Behavior: Behavior normal.         Thought Content: Thought content normal.      Physical Exam        Result Review :  The following data was reviewed by: Laverne Tristan MD on 05/07/2025:         Results              Procedures            Assessment & Plan  Type 2 diabetes mellitus without complication, without long-term current use of insulin      Orders:     Miscellaneous DME    Miscellaneous DME    CBC & Differential    Comprehensive Metabolic Panel    Lipid Panel    Hemoglobin A1c    Microalbumin / Creatinine Urine Ratio - Urine, Clean Catch; Future    TSH Rfx On Abnormal To Free T4; Future    Chronic bronchitis, unspecified chronic bronchitis type    Orders:    CBC & Differential    Comprehensive Metabolic Panel    Lipid Panel    Hemoglobin A1c    Microalbumin / Creatinine Urine Ratio - Urine, Clean Catch; Future    TSH Rfx On Abnormal To Free T4; Future    Hypothyroidism, unspecified type    Orders:    TSH Rfx On Abnormal To Free T4; Future    Vitamin D deficiency    Orders:    Vitamin D,25-Hydroxy    Congestive heart failure, unspecified HF chronicity, unspecified heart failure type               Personal history of tobacco use    Orders:     CT Chest Low Dose Cancer Screening WO; Future    Primary hypertension      Orders:    Miscellaneous DME    Miscellaneous DME    Anxiety              Assessment & Plan  Weight management:  - Commendable weight loss progress with no reported side effects  - Monitor for constipation, managed by fiber gummies, increased fluid intake, and more fruits  - Engage in activities promoting muscle growth  - Increase Ozempic to 2 mg, monitor for side effects    Hypertension:  - Elevated blood pressure readings, possibly due to dietary factors  - Target systolic BP ~150  - Increase lisinopril to 20 mg  - Advise home BP monitoring, prescribe BP cuff    Anxiety:  - Wellbutrin effective, no changes needed    Smoking:  - Continues smoking less than a pack a day for ~50 years  - Due for annual lung cancer screening  - Schedule lung scan    Patient or patient representative verbalized consent for the use of Ambient Listening during the visit with  Laverne Tristan MD for chart documentation. 5/8/2025  10:53 EDT      FOLLOW UP  Return in about 4 months (around 9/7/2025).  Patient was given instructions and counseling regarding her condition or  for health maintenance advice. Please see specific information pulled into the AVS if appropriate.     Laverne Tristan MD  05/07/25  16:56 EDT    CURRENT & DISCONTINUED MEDICATIONS  Current Outpatient Medications   Medication Instructions    acetaminophen (TYLENOL) 650 mg, Oral, Every 8 Hours PRN    atorvastatin (LIPITOR) 20 mg, Oral, Daily    buPROPion XL (WELLBUTRIN XL) 300 mg, Oral, Daily    ibuprofen (ADVIL,MOTRIN) 600 mg, Oral, Every 8 Hours PRN    [facility] COVID-19 Rapid Test kit See Admin Instructions    ipratropium-albuterol (Combivent Respimat)  MCG/ACT inhaler 2 puffs, Inhalation, 4 Times Daily - RT    levothyroxine (SYNTHROID, LEVOTHROID) 125 mcg, Oral, Daily    lisinopril (PRINIVIL,ZESTRIL) 20 mg, Oral, Daily    One Daily Womens tablet tablet 1 tablet, Daily    Ozempic (2 MG/DOSE) 2 mg, Subcutaneous, Weekly    [employer]Care Alcohol Swabs 70 % pads use with ozempic       Medications Discontinued During This Encounter   Medication Reason    Semaglutide, 1 MG/DOSE, (Ozempic, 1 MG/DOSE,) 4 MG/3ML solution pen-injector     vitamin D (ERGOCALCIFEROL) 1.25 MG (62099 UT) capsule capsule     lisinopril (PRINIVIL,ZESTRIL) 10 MG tablet Reorder

## 2025-05-07 NOTE — ASSESSMENT & PLAN NOTE
Orders:    CBC & Differential    Comprehensive Metabolic Panel    Lipid Panel    Hemoglobin A1c    Microalbumin / Creatinine Urine Ratio - Urine, Clean Catch; Future    TSH Rfx On Abnormal To Free T4; Future

## 2025-05-08 ENCOUNTER — RESULTS FOLLOW-UP (OUTPATIENT)
Dept: INTERNAL MEDICINE | Facility: CLINIC | Age: 65
End: 2025-05-08
Payer: COMMERCIAL

## 2025-05-08 ENCOUNTER — TELEPHONE (OUTPATIENT)
Dept: INTERNAL MEDICINE | Facility: CLINIC | Age: 65
End: 2025-05-08
Payer: COMMERCIAL

## 2025-05-08 RX ORDER — CHOLECALCIFEROL (VITAMIN D3) 25 MCG
1000 TABLET ORAL DAILY
Qty: 90 TABLET | Refills: 1 | Status: SHIPPED | OUTPATIENT
Start: 2025-05-08

## 2025-05-08 NOTE — TELEPHONE ENCOUNTER
Caller: Denise Brito    Relationship: Self    Best call back number:     [phone]     What test was performed: LABS    When was the test performed: 5.7.2025    Where was the test performed: IN OFFICE    Additional notes: PATIENT WOULD LIKE A CALL TO DISCUSS RESULTS.

## 2025-05-08 NOTE — TELEPHONE ENCOUNTER
Called and spoke with pt, explained to pt results from labs and provider comments, pt's verbalized understanding and had no further questions at this time.

## 2025-05-08 NOTE — TELEPHONE ENCOUNTER
Called and spoke with pt, explained to pt results from labs, explained to pt the need for Vit D, pt asked if we could send in script for that, script pended and sent to provider for sign off, explained to pt the importance of hydration, pt verbalized understanding and had no further questions at this time.

## 2025-05-15 RX ORDER — SEMAGLUTIDE 1.34 MG/ML
INJECTION, SOLUTION SUBCUTANEOUS
Qty: 3 ML | Refills: 1 | OUTPATIENT
Start: 2025-05-15

## 2025-05-15 NOTE — TELEPHONE ENCOUNTER
I believe we bumped her up to 2 mg if that is the case please refuse this refill.  If she did not like the doing wants to go back to the 1 mg okay to fill.

## 2025-05-15 NOTE — TELEPHONE ENCOUNTER
The original prescription was discontinued on 5/7/2025 by Laverne Tristan MD. Renewing this prescription may not be appropriate.

## 2025-05-15 NOTE — TELEPHONE ENCOUNTER
Pt called in to report that she is taking the 2 mg she just hasn't started it yet because she had days left of the 1 mg and insurance wouldn't cover the 2 mg yet because it wasn't time for a refill.

## 2025-05-22 ENCOUNTER — HOSPITAL ENCOUNTER (OUTPATIENT)
Dept: CT IMAGING | Facility: HOSPITAL | Age: 65
Discharge: HOME | End: 2025-05-22
Admitting: INTERNAL MEDICINE
Payer: COMMERCIAL

## 2025-05-22 DIAGNOSIS — Z87.891 PERSONAL HISTORY OF TOBACCO USE: ICD-10-CM

## 2025-05-22 PROCEDURE — 71271 CT THORAX LUNG CANCER SCR C-: CPT

## 2025-06-02 ENCOUNTER — TRANSCRIBE ORDERS (OUTPATIENT)
Dept: INTERNAL MEDICINE | Facility: CLINIC | Age: 65
End: 2025-06-02
Payer: COMMERCIAL

## 2025-06-02 DIAGNOSIS — Z12.31 SCREENING MAMMOGRAM FOR BREAST CANCER: Primary | ICD-10-CM

## 2025-06-09 ENCOUNTER — TELEPHONE (OUTPATIENT)
Dept: INTERNAL MEDICINE | Facility: CLINIC | Age: 65
End: 2025-06-09
Payer: COMMERCIAL

## 2025-06-09 NOTE — TELEPHONE ENCOUNTER
Pharmacy Name: [facility] [place] [place] - [address] - [phone] [facility] [phone]      Reference Number (if applicable): [id number]    Pharmacy representative name: TIM    Pharmacy representative phone number: [phone]     What medication are you calling in regards to: OZEMPIC     What question does the pharmacy have: NEEDING DIAGNOSIS CODE     Who is the provider that prescribed the medication: DAVID RAY

## 2025-06-09 NOTE — TELEPHONE ENCOUNTER
Called and spoke with Coleman, provided fernando the pharmacist with the code for diabetes, fernando verbalized understanding and had no further questions at this time.

## 2025-06-30 ENCOUNTER — TELEPHONE (OUTPATIENT)
Dept: INTERNAL MEDICINE | Facility: CLINIC | Age: 65
End: 2025-06-30
Payer: COMMERCIAL

## 2025-06-30 NOTE — TELEPHONE ENCOUNTER
Caller: Denise Brito    Relationship to patient: Self    Best call back number: [phone]    Chief complaint: MEDICATION REVIEW, TEST RESULTS     Type of visit: OFFICE     Requested date: THREE MONTH FOLLOW UP FROM 05/07/2025    If rescheduling, when is the original appointment: 10/15/2025 WHICH WAS THE FIRST AVAILABLE

## 2025-07-07 ENCOUNTER — TELEPHONE (OUTPATIENT)
Dept: INTERNAL MEDICINE | Facility: CLINIC | Age: 65
End: 2025-07-07
Payer: COMMERCIAL

## 2025-07-07 DIAGNOSIS — E11.9 TYPE 2 DIABETES MELLITUS WITHOUT COMPLICATION, WITHOUT LONG-TERM CURRENT USE OF INSULIN: Primary | ICD-10-CM

## 2025-07-07 RX ORDER — SEMAGLUTIDE 2.68 MG/ML
2 INJECTION, SOLUTION SUBCUTANEOUS WEEKLY
Qty: 3 ML | Refills: 1 | Status: SHIPPED | OUTPATIENT
Start: 2025-07-07

## 2025-07-07 RX ORDER — SEMAGLUTIDE 2.68 MG/ML
2 INJECTION, SOLUTION SUBCUTANEOUS WEEKLY
Qty: 3 ML | Refills: 1 | Status: SHIPPED | OUTPATIENT
Start: 2025-07-07 | End: 2025-07-07 | Stop reason: SDUPTHER

## 2025-07-07 RX ORDER — ATORVASTATIN CALCIUM 20 MG/1
20 TABLET, FILM COATED ORAL DAILY
Qty: 90 TABLET | Refills: 1 | Status: SHIPPED | OUTPATIENT
Start: 2025-07-07

## 2025-07-07 RX ORDER — BUPROPION HYDROCHLORIDE 300 MG/1
300 TABLET ORAL DAILY
Qty: 90 TABLET | Refills: 1 | Status: SHIPPED | OUTPATIENT
Start: 2025-07-07

## 2025-07-07 NOTE — TELEPHONE ENCOUNTER
Pharmacy Name: [facility] - [address] - [phone] [facility] [phone]        Pharmacy representative name: GIL    Pharmacy representative phone number: [phone]     What medication are you calling in regards to: OZEMPIC 2 MG    What question does the pharmacy have: CALLER NEEDING DIAGNOSIS CODE.    Who is the provider that prescribed the medication: DR. DAVID RAY

## 2025-08-12 RX ORDER — LEVOTHYROXINE SODIUM 125 UG/1
125 TABLET ORAL DAILY
Qty: 90 TABLET | Refills: 1 | Status: SHIPPED | OUTPATIENT
Start: 2025-08-12 | End: 2025-08-13 | Stop reason: SDUPTHER

## 2025-08-13 ENCOUNTER — OFFICE VISIT (OUTPATIENT)
Dept: INTERNAL MEDICINE | Facility: CLINIC | Age: 65
End: 2025-08-13
Payer: COMMERCIAL

## 2025-08-13 VITALS
HEIGHT: 61 IN | TEMPERATURE: 97.7 F | RESPIRATION RATE: 22 BRPM | HEART RATE: 64 BPM | BODY MASS INDEX: 33.91 KG/M2 | OXYGEN SATURATION: 98 % | WEIGHT: 179.6 LBS | SYSTOLIC BLOOD PRESSURE: 158 MMHG | DIASTOLIC BLOOD PRESSURE: 68 MMHG

## 2025-08-13 DIAGNOSIS — E11.9 TYPE 2 DIABETES MELLITUS WITHOUT COMPLICATION, WITHOUT LONG-TERM CURRENT USE OF INSULIN: ICD-10-CM

## 2025-08-13 DIAGNOSIS — I50.9 CONGESTIVE HEART FAILURE, UNSPECIFIED HF CHRONICITY, UNSPECIFIED HEART FAILURE TYPE: Primary | ICD-10-CM

## 2025-08-13 DIAGNOSIS — E55.9 VITAMIN D DEFICIENCY: ICD-10-CM

## 2025-08-13 DIAGNOSIS — J42 CHRONIC BRONCHITIS, UNSPECIFIED CHRONIC BRONCHITIS TYPE: ICD-10-CM

## 2025-08-13 DIAGNOSIS — E03.9 HYPOTHYROIDISM, UNSPECIFIED TYPE: ICD-10-CM

## 2025-08-13 LAB
25(OH)D3 SERPL-MCNC: 37.1 NG/ML (ref 30–100)
ALBUMIN SERPL-MCNC: 4 G/DL (ref 3.5–5.2)
ALBUMIN/GLOB SERPL: 1.6 G/DL
ALP SERPL-CCNC: 100 U/L (ref 39–117)
ALT SERPL W P-5'-P-CCNC: 18 U/L (ref 1–33)
ANION GAP SERPL CALCULATED.3IONS-SCNC: 10.6 MMOL/L (ref 5–15)
AST SERPL-CCNC: 22 U/L (ref 1–32)
BASOPHILS # BLD AUTO: 0.06 10*3/MM3 (ref 0–0.2)
BASOPHILS NFR BLD AUTO: 0.7 % (ref 0–1.5)
BILIRUB SERPL-MCNC: 0.3 MG/DL (ref 0–1.2)
BUN SERPL-MCNC: 18 MG/DL (ref 8–23)
BUN/CREAT SERPL: 15.7 (ref 7–25)
CALCIUM SPEC-SCNC: 9.8 MG/DL (ref 8.6–10.5)
CHLORIDE SERPL-SCNC: 107 MMOL/L (ref 98–107)
CHOLEST SERPL-MCNC: 127 MG/DL (ref 0–200)
CO2 SERPL-SCNC: 23.4 MMOL/L (ref 22–29)
CREAT SERPL-MCNC: 1.15 MG/DL (ref 0.57–1)
DEPRECATED RDW RBC AUTO: 42.1 FL (ref 37–54)
EGFRCR SERPLBLD CKD-EPI 2021: 53.6 ML/MIN/1.73
EOSINOPHIL # BLD AUTO: 0.14 10*3/MM3 (ref 0–0.4)
EOSINOPHIL NFR BLD AUTO: 1.7 % (ref 0.3–6.2)
ERYTHROCYTE [DISTWIDTH] IN BLOOD BY AUTOMATED COUNT: 12.4 % (ref 12.3–15.4)
GLOBULIN UR ELPH-MCNC: 2.5 GM/DL
GLUCOSE SERPL-MCNC: 98 MG/DL (ref 65–99)
HBA1C MFR BLD: 5.6 % (ref 4.8–5.6)
HCT VFR BLD AUTO: 37.7 % (ref 34–46.6)
HDLC SERPL-MCNC: 39 MG/DL (ref 40–60)
HGB BLD-MCNC: 12.6 G/DL (ref 12–15.9)
IMM GRANULOCYTES # BLD AUTO: 0.02 10*3/MM3 (ref 0–0.05)
IMM GRANULOCYTES NFR BLD AUTO: 0.2 % (ref 0–0.5)
LDLC SERPL CALC-MCNC: 69 MG/DL (ref 0–100)
LDLC/HDLC SERPL: 1.74 {RATIO}
LYMPHOCYTES # BLD AUTO: 2.21 10*3/MM3 (ref 0.7–3.1)
LYMPHOCYTES NFR BLD AUTO: 26.8 % (ref 19.6–45.3)
MCH RBC QN AUTO: 31 PG (ref 26.6–33)
MCHC RBC AUTO-ENTMCNC: 33.4 G/DL (ref 31.5–35.7)
MCV RBC AUTO: 92.9 FL (ref 79–97)
MONOCYTES # BLD AUTO: 0.64 10*3/MM3 (ref 0.1–0.9)
MONOCYTES NFR BLD AUTO: 7.8 % (ref 5–12)
NEUTROPHILS NFR BLD AUTO: 5.17 10*3/MM3 (ref 1.7–7)
NEUTROPHILS NFR BLD AUTO: 62.8 % (ref 42.7–76)
NRBC BLD AUTO-RTO: 0 /100 WBC (ref 0–0.2)
PLATELET # BLD AUTO: 241 10*3/MM3 (ref 140–450)
PMV BLD AUTO: 12.2 FL (ref 6–12)
POTASSIUM SERPL-SCNC: 4.1 MMOL/L (ref 3.5–5.2)
PROT SERPL-MCNC: 6.5 G/DL (ref 6–8.5)
RBC # BLD AUTO: 4.06 10*6/MM3 (ref 3.77–5.28)
SODIUM SERPL-SCNC: 141 MMOL/L (ref 136–145)
T4 FREE SERPL-MCNC: 1.73 NG/DL (ref 0.92–1.68)
TRIGL SERPL-MCNC: 101 MG/DL (ref 0–150)
TSH SERPL DL<=0.05 MIU/L-ACNC: 0.05 UIU/ML (ref 0.27–4.2)
VLDLC SERPL-MCNC: 19 MG/DL (ref 5–40)
WBC NRBC COR # BLD AUTO: 8.24 10*3/MM3 (ref 3.4–10.8)

## 2025-08-13 PROCEDURE — 83036 HEMOGLOBIN GLYCOSYLATED A1C: CPT | Performed by: INTERNAL MEDICINE

## 2025-08-13 PROCEDURE — 84439 ASSAY OF FREE THYROXINE: CPT | Performed by: INTERNAL MEDICINE

## 2025-08-13 PROCEDURE — 80053 COMPREHEN METABOLIC PANEL: CPT | Performed by: INTERNAL MEDICINE

## 2025-08-13 PROCEDURE — 80061 LIPID PANEL: CPT | Performed by: INTERNAL MEDICINE

## 2025-08-13 PROCEDURE — 85025 COMPLETE CBC W/AUTO DIFF WBC: CPT | Performed by: INTERNAL MEDICINE

## 2025-08-13 PROCEDURE — 84443 ASSAY THYROID STIM HORMONE: CPT | Performed by: INTERNAL MEDICINE

## 2025-08-13 PROCEDURE — 82306 VITAMIN D 25 HYDROXY: CPT | Performed by: INTERNAL MEDICINE

## 2025-08-13 RX ORDER — SEMAGLUTIDE 2.68 MG/ML
2 INJECTION, SOLUTION SUBCUTANEOUS WEEKLY
Qty: 3 ML | Refills: 1 | Status: SHIPPED | OUTPATIENT
Start: 2025-08-13

## 2025-08-13 RX ORDER — LISINOPRIL 40 MG/1
40 TABLET ORAL DAILY
Qty: 90 TABLET | Refills: 1 | Status: SHIPPED | OUTPATIENT
Start: 2025-08-13

## 2025-08-13 RX ORDER — LEVOTHYROXINE SODIUM 125 UG/1
125 TABLET ORAL DAILY
Qty: 90 TABLET | Refills: 1 | Status: SHIPPED | OUTPATIENT
Start: 2025-08-13